# Patient Record
Sex: MALE | Race: WHITE | NOT HISPANIC OR LATINO
[De-identification: names, ages, dates, MRNs, and addresses within clinical notes are randomized per-mention and may not be internally consistent; named-entity substitution may affect disease eponyms.]

---

## 2023-06-20 ENCOUNTER — APPOINTMENT (OUTPATIENT)
Dept: UROLOGY | Facility: CLINIC | Age: 73
End: 2023-06-20
Payer: MEDICARE

## 2023-06-20 VITALS
RESPIRATION RATE: 17 BRPM | DIASTOLIC BLOOD PRESSURE: 77 MMHG | HEIGHT: 66 IN | TEMPERATURE: 97.9 F | BODY MASS INDEX: 25.71 KG/M2 | SYSTOLIC BLOOD PRESSURE: 126 MMHG | HEART RATE: 92 BPM | WEIGHT: 160 LBS

## 2023-06-20 DIAGNOSIS — Z80.3 FAMILY HISTORY OF MALIGNANT NEOPLASM OF BREAST: ICD-10-CM

## 2023-06-20 DIAGNOSIS — Z87.891 PERSONAL HISTORY OF NICOTINE DEPENDENCE: ICD-10-CM

## 2023-06-20 DIAGNOSIS — Z78.9 OTHER SPECIFIED HEALTH STATUS: ICD-10-CM

## 2023-06-20 PROCEDURE — 51798 US URINE CAPACITY MEASURE: CPT

## 2023-06-20 PROCEDURE — 99203 OFFICE O/P NEW LOW 30 MIN: CPT

## 2023-06-20 NOTE — ASSESSMENT
[FreeTextEntry1] : History of severe phimosis x several years The last year or 2 it has gotten worse. The urine balloons in the foreskin .\par We discussed the risks and complication of the surgery including bleeding , infection . We discussed pain management

## 2023-06-20 NOTE — PHYSICAL EXAM
[General Appearance - Well Developed] : well developed [General Appearance - Well Nourished] : well nourished [] : no respiratory distress [Normal Station and Gait] : the gait and station were normal for the patient's age [Skin Color & Pigmentation] : normal skin color and pigmentation [No Focal Deficits] : no focal deficits [Oriented To Time, Place, And Person] : oriented to person, place, and time [No Palpable Adenopathy] : no palpable adenopathy [FreeTextEntry1] : severe phimosis

## 2023-07-13 ENCOUNTER — OUTPATIENT (OUTPATIENT)
Dept: OUTPATIENT SERVICES | Facility: HOSPITAL | Age: 73
LOS: 1 days | End: 2023-07-13
Payer: MEDICARE

## 2023-07-13 VITALS
TEMPERATURE: 98 F | SYSTOLIC BLOOD PRESSURE: 128 MMHG | HEART RATE: 70 BPM | HEIGHT: 66.5 IN | WEIGHT: 156.09 LBS | DIASTOLIC BLOOD PRESSURE: 80 MMHG | RESPIRATION RATE: 16 BRPM | OXYGEN SATURATION: 97 %

## 2023-07-13 DIAGNOSIS — N47.1 PHIMOSIS: ICD-10-CM

## 2023-07-13 DIAGNOSIS — Z98.890 OTHER SPECIFIED POSTPROCEDURAL STATES: Chronic | ICD-10-CM

## 2023-07-13 LAB
ANION GAP SERPL CALC-SCNC: 10 MMOL/L — SIGNIFICANT CHANGE UP (ref 7–14)
BUN SERPL-MCNC: 17 MG/DL — SIGNIFICANT CHANGE UP (ref 7–23)
CALCIUM SERPL-MCNC: 9.9 MG/DL — SIGNIFICANT CHANGE UP (ref 8.4–10.5)
CHLORIDE SERPL-SCNC: 105 MMOL/L — SIGNIFICANT CHANGE UP (ref 98–107)
CO2 SERPL-SCNC: 26 MMOL/L — SIGNIFICANT CHANGE UP (ref 22–31)
CREAT SERPL-MCNC: 1.05 MG/DL — SIGNIFICANT CHANGE UP (ref 0.5–1.3)
EGFR: 75 ML/MIN/1.73M2 — SIGNIFICANT CHANGE UP
GLUCOSE SERPL-MCNC: 71 MG/DL — SIGNIFICANT CHANGE UP (ref 70–99)
HCT VFR BLD CALC: 46.9 % — SIGNIFICANT CHANGE UP (ref 39–50)
HGB BLD-MCNC: 15.8 G/DL — SIGNIFICANT CHANGE UP (ref 13–17)
MCHC RBC-ENTMCNC: 31.3 PG — SIGNIFICANT CHANGE UP (ref 27–34)
MCHC RBC-ENTMCNC: 33.7 GM/DL — SIGNIFICANT CHANGE UP (ref 32–36)
MCV RBC AUTO: 93.1 FL — SIGNIFICANT CHANGE UP (ref 80–100)
NRBC # BLD: 0 /100 WBCS — SIGNIFICANT CHANGE UP (ref 0–0)
NRBC # FLD: 0 K/UL — SIGNIFICANT CHANGE UP (ref 0–0)
PLATELET # BLD AUTO: 164 K/UL — SIGNIFICANT CHANGE UP (ref 150–400)
POTASSIUM SERPL-MCNC: 4.8 MMOL/L — SIGNIFICANT CHANGE UP (ref 3.5–5.3)
POTASSIUM SERPL-SCNC: 4.8 MMOL/L — SIGNIFICANT CHANGE UP (ref 3.5–5.3)
RBC # BLD: 5.04 M/UL — SIGNIFICANT CHANGE UP (ref 4.2–5.8)
RBC # FLD: 13.4 % — SIGNIFICANT CHANGE UP (ref 10.3–14.5)
SODIUM SERPL-SCNC: 141 MMOL/L — SIGNIFICANT CHANGE UP (ref 135–145)
WBC # BLD: 8.13 K/UL — SIGNIFICANT CHANGE UP (ref 3.8–10.5)
WBC # FLD AUTO: 8.13 K/UL — SIGNIFICANT CHANGE UP (ref 3.8–10.5)

## 2023-07-13 PROCEDURE — 93010 ELECTROCARDIOGRAM REPORT: CPT

## 2023-07-13 RX ORDER — SODIUM CHLORIDE 9 MG/ML
1000 INJECTION, SOLUTION INTRAVENOUS
Refills: 0 | Status: DISCONTINUED | OUTPATIENT
Start: 2023-07-19 | End: 2023-08-01

## 2023-07-13 NOTE — H&P PST ADULT - NSICDXFAMILYHX_GEN_ALL_CORE_FT
FAMILY HISTORY:  Sibling  Still living? Yes, Estimated age: Age Unknown  FH: thyroid cancer, Age at diagnosis: Age Unknown    Child  Still living? Unknown  FH: leukemia, Age at diagnosis: Age Unknown

## 2023-07-13 NOTE — H&P PST ADULT - ENMT COMMENTS
denies loose teeth, has crowns, has permanent upper and lower bridge, has partial upper denture Mallampati I/II on phonation

## 2023-07-13 NOTE — H&P PST ADULT - NSANTHOSAYNRD_GEN_A_CORE
No. ROSEMARIE screening performed.  STOP BANG Legend: 0-2 = LOW Risk; 3-4 = INTERMEDIATE Risk; 5-8 = HIGH Risk

## 2023-07-13 NOTE — H&P PST ADULT - NSICDXPASTMEDICALHX_GEN_ALL_CORE_FT
PAST MEDICAL HISTORY:  H/O medial meniscus repair of left knee     H/O microdiscectomy     H/O repair of left rotator cuff     H/O right inguinal hernia repair     History of pneumonia     History of removal of cyst      PAST MEDICAL HISTORY:  History of pneumonia     Phimosis

## 2023-07-13 NOTE — H&P PST ADULT - NSICDXPASTSURGICALHX_GEN_ALL_CORE_FT
PAST SURGICAL HISTORY:  H/O medial meniscus repair of left knee     H/O microdiscectomy     H/O repair of left rotator cuff     H/O right inguinal hernia repair     History of removal of cyst

## 2023-07-15 NOTE — ASU PATIENT PROFILE, ADULT - FALL HARM RISK - UNIVERSAL INTERVENTIONS
Bed in lowest position, wheels locked, appropriate side rails in place/Call bell, personal items and telephone in reach/Instruct patient to call for assistance before getting out of bed or chair/Non-slip footwear when patient is out of bed/[place] to call system/Physically safe environment - no spills, clutter or unnecessary equipment/Purposeful Proactive Rounding/Room/bathroom lighting operational, light cord in reach

## 2023-07-16 ENCOUNTER — TRANSCRIPTION ENCOUNTER (OUTPATIENT)
Age: 73
End: 2023-07-16

## 2023-07-17 ENCOUNTER — TRANSCRIPTION ENCOUNTER (OUTPATIENT)
Age: 73
End: 2023-07-17

## 2023-07-17 ENCOUNTER — APPOINTMENT (OUTPATIENT)
Dept: UROLOGY | Facility: HOSPITAL | Age: 73
End: 2023-07-17

## 2023-07-17 ENCOUNTER — OUTPATIENT (OUTPATIENT)
Dept: OUTPATIENT SERVICES | Facility: HOSPITAL | Age: 73
LOS: 1 days | Discharge: HOME | End: 2023-07-17
Payer: MEDICARE

## 2023-07-17 ENCOUNTER — RESULT REVIEW (OUTPATIENT)
Age: 73
End: 2023-07-17

## 2023-07-17 VITALS
SYSTOLIC BLOOD PRESSURE: 103 MMHG | RESPIRATION RATE: 16 BRPM | OXYGEN SATURATION: 100 % | HEART RATE: 60 BPM | DIASTOLIC BLOOD PRESSURE: 69 MMHG

## 2023-07-17 VITALS
HEART RATE: 65 BPM | RESPIRATION RATE: 18 BRPM | WEIGHT: 156.09 LBS | DIASTOLIC BLOOD PRESSURE: 87 MMHG | TEMPERATURE: 98 F | HEIGHT: 66.5 IN | OXYGEN SATURATION: 100 % | SYSTOLIC BLOOD PRESSURE: 116 MMHG

## 2023-07-17 DIAGNOSIS — N47.1 PHIMOSIS: ICD-10-CM

## 2023-07-17 DIAGNOSIS — Z98.890 OTHER SPECIFIED POSTPROCEDURAL STATES: Chronic | ICD-10-CM

## 2023-07-17 PROCEDURE — 54161 CIRCUM 28 DAYS OR OLDER: CPT

## 2023-07-17 PROCEDURE — 88304 TISSUE EXAM BY PATHOLOGIST: CPT | Mod: 26

## 2023-07-17 RX ORDER — OXYCODONE HYDROCHLORIDE 5 MG/1
5 TABLET ORAL ONCE
Refills: 0 | Status: DISCONTINUED | OUTPATIENT
Start: 2023-07-17 | End: 2023-07-17

## 2023-07-17 RX ORDER — FENTANYL CITRATE 50 UG/ML
50 INJECTION INTRAVENOUS
Refills: 0 | Status: DISCONTINUED | OUTPATIENT
Start: 2023-07-17 | End: 2023-07-17

## 2023-07-17 RX ORDER — ACETAMINOPHEN 500 MG
1000 TABLET ORAL ONCE
Refills: 0 | Status: COMPLETED | OUTPATIENT
Start: 2023-07-17 | End: 2023-07-17

## 2023-07-17 RX ORDER — ONDANSETRON 8 MG/1
4 TABLET, FILM COATED ORAL ONCE
Refills: 0 | Status: DISCONTINUED | OUTPATIENT
Start: 2023-07-17 | End: 2023-08-01

## 2023-07-17 RX ADMIN — OXYCODONE HYDROCHLORIDE 5 MILLIGRAM(S): 5 TABLET ORAL at 17:34

## 2023-07-17 RX ADMIN — Medication 1000 MILLIGRAM(S): at 17:45

## 2023-07-17 RX ADMIN — OXYCODONE HYDROCHLORIDE 5 MILLIGRAM(S): 5 TABLET ORAL at 18:00

## 2023-07-17 RX ADMIN — Medication 400 MILLIGRAM(S): at 17:30

## 2023-07-17 NOTE — ASU DISCHARGE PLAN (ADULT/PEDIATRIC) - NURSING INSTRUCTIONS
Please report any signs and symptoms of infection including Fever (Temp >101 or >100.4 if GYN procedure), uncontrollable nausea, vomiting, diarrhea, chills & inability to urinate. Shower with soap & water when appropriate, pat dry with clean towel & no ointments, creams, powders or lotions on incisions unless okayed by MD. Please report any puss or increased drainage from incision sites, or if redness develops and spreads around sites. Please practice good hand hygiene especially after using the bathroom. Follow up with all MD appointments and take medication(s) as prescribed  DO NOT take any Tylenol (Acetaminophen) or narcotics containing Tylenol until after 11:30 pm on 7/17/23 . You received Tylenol during your operation and it can cause damage to your liver if too much is taken within a 24 hour time period.

## 2023-07-17 NOTE — ASU DISCHARGE PLAN (ADULT/PEDIATRIC) - FOLLOW UP APPOINTMENTS
500 may also call Recovery Room (PACU) 24/7 @ [phone]/[facility]

## 2023-07-17 NOTE — ASU DISCHARGE PLAN (ADULT/PEDIATRIC) - ASU DC SPECIAL INSTRUCTIONSFT
PAIN CONTROL: You may take 650 mg of Tylenol every 4-6 hours. Do not exceed 4 grams of Tylenol daily. Take oxycodone as needed for severe pain, one tab every 6 hours. Do not exceed 4 tabs daily.  BATHING: Please do not submerge wound underwater. You may shower after 48 hours and/or sponge bathe.  ACTIVITY: No heavy lifting or straining. Otherwise, you may return to your usual level of physical activity. If you are taking narcotic pain medication (such as oxycodone), do NOT drive a car, operate machinery or make important decisions.  DIET: Return to your usual diet.  NOTIFY YOUR SURGEON IF: You have any bleeding that does not stop, any pus draining from your wound, any fever (over 100.4 F) or chills, persistent nausea/vomiting, persistent diarrhea, or if your pain is not controlled on your discharge pain medications.  FOLLOW-UP:  1. Please call your surgeon to make a follow-up appointment within 1-2 weeks of discharge

## 2023-07-19 ENCOUNTER — APPOINTMENT (OUTPATIENT)
Dept: UROLOGY | Facility: HOSPITAL | Age: 73
End: 2023-07-19

## 2023-07-31 LAB — SURGICAL PATHOLOGY STUDY: SIGNIFICANT CHANGE UP

## 2023-08-04 NOTE — BRIEF OPERATIVE NOTE - ASSISTANT(S)
Jigna Marinelli is a 24 y.o. male who presents to the office today for the following:    Chief Complaint   Patient presents with    New Patient     Estab care       Past Medical History:   Diagnosis Date    Allergic rhinitis         History reviewed. No pertinent surgical history. Family History   Problem Relation Age of Onset    Diabetes Other         Social History     Tobacco Use    Smoking status: Never    Smokeless tobacco: Never   Vaping Use    Vaping Use: Never used   Substance Use Topics    Alcohol use: Yes     Comment: occassional    Drug use: Never        HPI  Patient here to establish as new patient with provider and annual wellness exam with [facility] vitiligo and seasonal allergies. Follows with Dermatology in [place]. Previous PCP Francie in [place], records requested. 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 Denies complaints today. Chief Complaint   Patient presents with    New Patient     Estab care       No current outpatient medications on file prior to visit. No current facility-administered medications on file prior to visit. Current Outpatient Medications   Medication Sig Dispense Refill    fluticasone (FLONASE) 50 MCG/ACT nasal spray 1 spray by Each Nostril route daily 16 g 3    levocetirizine (XYZAL) 5 MG tablet Take 1 tablet by mouth nightly 30 tablet 3    montelukast (SINGULAIR) 10 MG tablet Take 1 tablet by mouth daily 30 tablet 3     No current facility-administered medications for this visit. Review of Systems   Constitutional:  Negative for activity change, chills, fatigue and fever. HENT:  Negative for congestion, ear pain, postnasal drip, rhinorrhea, sinus pain, sneezing and sore throat. Eyes:  Negative for pain and visual disturbance. Cardiovascular:  Negative for chest pain, palpitations and leg swelling. Gastrointestinal:  Negative for abdominal pain, blood in stool, constipation, diarrhea, nausea and vomiting. Betzaida Sylvester

## 2023-08-16 ENCOUNTER — APPOINTMENT (OUTPATIENT)
Dept: UROLOGY | Facility: CLINIC | Age: 73
End: 2023-08-16
Payer: MEDICARE

## 2023-08-16 VITALS
TEMPERATURE: 98.3 F | DIASTOLIC BLOOD PRESSURE: 82 MMHG | SYSTOLIC BLOOD PRESSURE: 113 MMHG | HEIGHT: 66 IN | BODY MASS INDEX: 30.53 KG/M2 | HEART RATE: 111 BPM | RESPIRATION RATE: 17 BRPM | WEIGHT: 190 LBS

## 2023-08-16 DIAGNOSIS — Z98.890 OTHER SPECIFIED POSTPROCEDURAL STATES: ICD-10-CM

## 2023-08-16 PROCEDURE — 99024 POSTOP FOLLOW-UP VISIT: CPT

## 2023-08-16 RX ORDER — CLOTRIMAZOLE AND BETAMETHASONE DIPROPIONATE 10; .5 MG/G; MG/G
1-0.05 CREAM TOPICAL
Qty: 1 | Refills: 1 | Status: ACTIVE | COMMUNITY
Start: 2023-08-16 | End: 1900-01-01

## 2023-08-16 NOTE — ASSESSMENT
[FreeTextEntry1] : He is doing well . There was some sensativity initially post op but it has decreased over time . There is scaly dry skin on the head of the penois will give him some cream to apply 2 x/day  Follow up prn

## 2023-08-16 NOTE — PHYSICAL EXAM
[Abdomen Soft] : soft [Heart Rate And Rhythm] : Heart rate and rhythm were normal [] : no respiratory distress [Oriented To Time, Place, And Person] : oriented to person, place, and time [Normal Station and Gait] : the gait and station were normal for the patient's age

## 2023-08-17 PROBLEM — Z98.890 HISTORY OF CIRCUMCISION: Status: RESOLVED | Noted: 2023-08-16 | Resolved: 2023-08-17

## 2023-10-27 ENCOUNTER — APPOINTMENT (OUTPATIENT)
Dept: UROLOGY | Facility: CLINIC | Age: 73
End: 2023-10-27
Payer: MEDICARE

## 2023-10-27 PROCEDURE — 99213 OFFICE O/P EST LOW 20 MIN: CPT

## 2023-11-03 ENCOUNTER — APPOINTMENT (OUTPATIENT)
Dept: UROLOGY | Facility: CLINIC | Age: 73
End: 2023-11-03
Payer: MEDICARE

## 2023-11-03 DIAGNOSIS — N47.1 PHIMOSIS: ICD-10-CM

## 2023-11-03 PROCEDURE — 54162 LYSIS PENIL CIRCUMIC LESION: CPT

## 2023-12-01 ENCOUNTER — APPOINTMENT (OUTPATIENT)
Dept: UROLOGY | Facility: CLINIC | Age: 73
End: 2023-12-01

## 2024-03-16 ENCOUNTER — APPOINTMENT (OUTPATIENT)
Dept: ORTHOPEDIC SURGERY | Facility: CLINIC | Age: 74
End: 2024-03-16
Payer: MEDICARE

## 2024-03-16 VITALS
SYSTOLIC BLOOD PRESSURE: 130 MMHG | HEIGHT: 66 IN | DIASTOLIC BLOOD PRESSURE: 77 MMHG | WEIGHT: 155 LBS | HEART RATE: 80 BPM | BODY MASS INDEX: 24.91 KG/M2

## 2024-03-16 DIAGNOSIS — M17.0 BILATERAL PRIMARY OSTEOARTHRITIS OF KNEE: ICD-10-CM

## 2024-03-16 DIAGNOSIS — M25.569 PAIN IN UNSPECIFIED KNEE: ICD-10-CM

## 2024-03-16 DIAGNOSIS — M25.552 PAIN IN LEFT HIP: ICD-10-CM

## 2024-03-16 DIAGNOSIS — G89.29 PAIN IN UNSPECIFIED HIP: ICD-10-CM

## 2024-03-16 DIAGNOSIS — M16.12 UNILATERAL PRIMARY OSTEOARTHRITIS, LEFT HIP: ICD-10-CM

## 2024-03-16 DIAGNOSIS — M25.559 PAIN IN UNSPECIFIED HIP: ICD-10-CM

## 2024-03-16 PROCEDURE — 73502 X-RAY EXAM HIP UNI 2-3 VIEWS: CPT | Mod: LT

## 2024-03-16 PROCEDURE — 99204 OFFICE O/P NEW MOD 45 MIN: CPT | Mod: 25

## 2024-03-16 PROCEDURE — 20610 DRAIN/INJ JOINT/BURSA W/O US: CPT | Mod: RT

## 2024-03-16 PROCEDURE — 73562 X-RAY EXAM OF KNEE 3: CPT | Mod: 50

## 2024-03-16 RX ORDER — LIDOCAINE HYDROCHLORIDE 10 MG/ML
1 INJECTION, SOLUTION INFILTRATION; PERINEURAL
Refills: 0 | Status: COMPLETED | OUTPATIENT
Start: 2024-03-16

## 2024-03-16 RX ORDER — METHYLPRED ACET/NACL,ISO-OS/PF 40 MG/ML
40 VIAL (ML) INJECTION
Qty: 1 | Refills: 0 | Status: COMPLETED | OUTPATIENT
Start: 2024-03-16

## 2024-03-16 RX ADMIN — METHYLPREDNISOLONE ACETATE 2 MG/ML: 40 INJECTION, SUSPENSION INTRA-ARTICULAR; INTRALESIONAL; INTRAMUSCULAR; SOFT TISSUE at 00:00

## 2024-03-16 RX ADMIN — LIDOCAINE HYDROCHLORIDE 3 %: 10 INJECTION, SOLUTION INFILTRATION; PERINEURAL at 00:00

## 2024-03-16 NOTE — PHYSICAL EXAM
[Antalgic] : antalgic [LE] : Sensory: Intact in bilateral lower extremities [DP] : dorsalis pedis 2+ and symmetric bilaterally [PT] : posterior tibial 2+ and symmetric bilaterally [Normal] : no peripheral adenopathy appreciated [de-identified] : X-rays of the right and left knee: There is arthritic changes most notably in the medial compartment of both knees there is chondrocalcinosis of the right knee noted  X-rays of the left hip show severe arthritic changes of the left hip with bone-on-bone contact periarticular osteophytes and cystic changes. [de-identified] : Examination of both knees: Inspection: There is neutral alignment noted there is no signs of trauma there is normal muscle tone Palpation: There is significant tenderness over the medial joint line of the right knee. Effusion: None bilateral Range of motion: 0-1 40 the right knee experiences significant pain with deep flexion Ligamentous exam: Stable with varus and valgus stress and anterior posterior drawer. Motor and sensory exam in the bilateral lower extremities is intact.  Examination left hip: Inspection: Leg lengths are grossly equal measured supine on the exam table Palpation: No tenderness to palpation Range of motion: Significant pain and decreased range of motion hip flexion 0-70 external rotation 0-20 internal rotation 0 to 10 degrees

## 2024-03-16 NOTE — REASON FOR VISIT
[Initial Visit] : an initial visit for [Hip Pain] : hip pain [Knee Pain] : knee pain [FreeTextEntry2] : bilateral knee pain

## 2024-03-16 NOTE — DISCUSSION/SUMMARY
[Surgical risks reviewed] : Surgical risks reviewed [Medication Risks Reviewed] : Medication risks reviewed [de-identified] : Impression: Bilateral knee osteoarthritis Bilateral knee pain Left hip osteoarthritis  Plan: Treatment options were reviewed with the patient he does have end-stage arthritis of his left hip and failure of conservative treatment risks benefits alternatives to an anterior total hip arthroplasty were reviewed with the patient the preoperative operative and postoperative course were also reviewed and all his questions were answered.  He does have arthritic changes noted on x-ray of his knees but his right knee is having mechanical symptoms at this time would recommend obtaining an MRI of the right knee to assess for internal derangement as well as to assess the articular surfaces for the extent of the arthritis in the meantime did offer him a steroid injection in the right knee to temporize his symptoms.  He would like to proceed with the steroid injection risks benefits alternatives to the steroid injection of the right knee were reviewed with the patient postinjection instructions were given.  Patient would like to proceed with a left anterior total hip arthroplasty.  I have  seen and evaluated this patient and is guiding this patients entire orthopedic management plan. The patient was advised that based upon their clinical presentation and radiographic findings they meet inclusion criteria for benefitting from an Anterior approach(ASI) total hip arthroplasty as a treatment option for severe arthritis of their right hip. The spectrum of treatments for severe hip DJD were reviewed in detail. I reviewed in detail the goals, alternatives, risks and benefits of ASI total hip arthroplasty.  The patient was advised of the possible complications which are inclusive of but not exclusive to VTE-related, infectious-related, anesthetic-related, surgically-related, Lateral Femoral Cutaneous nerve injury-related, medically-related, and implant-related. The patient was advised that limb length equality may not be assured secondary to variabilities in pelvic malrotation, stable intraoperative fit, opposite side wear, and other anatomic deformities of the lower extremity. The patient was advised of the goals, alternatives, risks and benefits of autologous, directed and banked blood product transfusions for perioperative blood losses. A non-cemented type hip implant is utilized in consideration of bony integrity intraoperatively.  The patient was educated regarding the surgical procedure steps, especially using an Hip implant and bone model, as well as steps in their hospital course and primary discharge planning for home discharge with home care and home PT. Choices for implant 's, mainly DJO and Biomet-Carmen were reviewed, with selection to be made by surgeon intraoperatively. The patient was advised of the goals, alternatives, risks and benefits of a 3 week course of Celebrex 200 mg BID utilized by Dr. Webb in their practice to prevent Heterotopic Ossification seen in patients post total hip arthroplasty. If this is medically contraindicated the alternative would be a single dose (800cGy) radiation treatment to the hip implant site performed pre-operatively. A consultation with the Radiation Oncologist at [facility] will then be required. I reviewed the plan of care as well as a model of the Hip implant equivalent to the one that will be used for the THR. The patient agreed to the plan of care as well as the use of implants for their THR. The patient was advised that they will require a medical preoperative risk evaluation by their PCP. Further medical subspecialty clearances such as cardiac may be indicated if felt needed by their PCP. The patient was advised he/she may call our office after careful consideration of their treatment options and further consultation The patient was thoroughly educated using models and the actual implant.  All of the patient's questions were answered to their satisfaction.  The patient would need a bedside commode and a rolling walker for use for activities of daily living status post a total joint replacement .

## 2024-03-16 NOTE — HISTORY OF PRESENT ILLNESS
[de-identified] : 73-year-old male presents complaining of bilateral knee pain has had for several years.  He notes a dull achy pain in both knees worse with activity ascending descending stairs bending and squatting to maneuvers long periods of ambulation the right knee is worse than the left he feels like the knees can give out on him and it feels popping and clicking.  He denies any pain at rest he also notes left groin pain which is also worse with activity he has difficulty putting shoes and socks on the left side he currently ambulates without assistive devices he is otherwise in his normal state of health he has tried physical therapy anti-inflammatory medication.  He has had injection in his knee in the past but it was a long time ago.

## 2024-03-16 NOTE — PROCEDURE
[Injection] : Injection [Right] : of the right [Knee Joint] : knee joint [Joint Pain] : Joint pain [Inflammation] : Inflammation [Osteoarthritis] : Osteoarthritis [Risk] : risk [Patient] : patient [Benefits] : benefits [Alternatives] : alternatives [Bleeding] : bleeding [Infection] : infection [Verbal Consent Obtained] : verbal consent was obtained prior to the procedure [Allergic Reaction] : allergic reaction [Medial] : medial [22] : a 22-gauge [Methylpred. 40mg/mL___(mL)] : [unfilled] mL of 40mg/mL methylprednisolone [1.5  inch] : 1.5 inch needle [Tolerated Well] : The patient tolerated the procedure well [Bandage Applied] : a bandage [None] : none [No Strenuous Activity___day(s)] : avoid strenuous activity for [unfilled] day(s) [PRN] : as needed [FreeTextEntry1] : chlorahexadine

## 2024-03-20 ENCOUNTER — APPOINTMENT (OUTPATIENT)
Dept: MRI IMAGING | Facility: CLINIC | Age: 74
End: 2024-03-20
Payer: MEDICARE

## 2024-03-20 ENCOUNTER — OUTPATIENT (OUTPATIENT)
Dept: OUTPATIENT SERVICES | Facility: HOSPITAL | Age: 74
LOS: 1 days | End: 2024-03-20
Payer: MEDICARE

## 2024-03-20 ENCOUNTER — RESULT REVIEW (OUTPATIENT)
Age: 74
End: 2024-03-20

## 2024-03-20 DIAGNOSIS — Z98.890 OTHER SPECIFIED POSTPROCEDURAL STATES: Chronic | ICD-10-CM

## 2024-03-20 DIAGNOSIS — M25.569 PAIN IN UNSPECIFIED KNEE: ICD-10-CM

## 2024-03-20 PROCEDURE — A9585: CPT

## 2024-03-20 PROCEDURE — 73723 MRI JOINT LWR EXTR W/O&W/DYE: CPT

## 2024-03-20 PROCEDURE — 73723 MRI JOINT LWR EXTR W/O&W/DYE: CPT | Mod: 26,RT

## 2024-03-26 ENCOUNTER — OUTPATIENT (OUTPATIENT)
Dept: OUTPATIENT SERVICES | Facility: HOSPITAL | Age: 74
LOS: 1 days | End: 2024-03-26
Payer: MEDICARE

## 2024-03-26 VITALS
RESPIRATION RATE: 14 BRPM | DIASTOLIC BLOOD PRESSURE: 80 MMHG | SYSTOLIC BLOOD PRESSURE: 130 MMHG | HEART RATE: 70 BPM | HEIGHT: 66.5 IN | TEMPERATURE: 97 F | WEIGHT: 145.95 LBS | OXYGEN SATURATION: 99 %

## 2024-03-26 DIAGNOSIS — Z01.818 ENCOUNTER FOR OTHER PREPROCEDURAL EXAMINATION: ICD-10-CM

## 2024-03-26 DIAGNOSIS — Z98.890 OTHER SPECIFIED POSTPROCEDURAL STATES: Chronic | ICD-10-CM

## 2024-03-26 DIAGNOSIS — M16.12 UNILATERAL PRIMARY OSTEOARTHRITIS, LEFT HIP: ICD-10-CM

## 2024-03-26 LAB
A1C WITH ESTIMATED AVERAGE GLUCOSE RESULT: 5.5 % — SIGNIFICANT CHANGE UP (ref 4–5.6)
ALBUMIN SERPL ELPH-MCNC: 3.7 G/DL — SIGNIFICANT CHANGE UP (ref 3.3–5)
ALP SERPL-CCNC: 117 U/L — SIGNIFICANT CHANGE UP (ref 30–120)
ALT FLD-CCNC: 26 U/L — SIGNIFICANT CHANGE UP (ref 10–60)
ANION GAP SERPL CALC-SCNC: 6 MMOL/L — SIGNIFICANT CHANGE UP (ref 5–17)
APTT BLD: 31.5 SEC — SIGNIFICANT CHANGE UP (ref 24.5–35.6)
AST SERPL-CCNC: 17 U/L — SIGNIFICANT CHANGE UP (ref 10–40)
BILIRUB SERPL-MCNC: 0.3 MG/DL — SIGNIFICANT CHANGE UP (ref 0.2–1.2)
BLD GP AB SCN SERPL QL: SIGNIFICANT CHANGE UP
BUN SERPL-MCNC: 19 MG/DL — SIGNIFICANT CHANGE UP (ref 7–23)
CALCIUM SERPL-MCNC: 9.6 MG/DL — SIGNIFICANT CHANGE UP (ref 8.4–10.5)
CHLORIDE SERPL-SCNC: 104 MMOL/L — SIGNIFICANT CHANGE UP (ref 96–108)
CO2 SERPL-SCNC: 31 MMOL/L — SIGNIFICANT CHANGE UP (ref 22–31)
CREAT SERPL-MCNC: 1.06 MG/DL — SIGNIFICANT CHANGE UP (ref 0.5–1.3)
EGFR: 74 ML/MIN/1.73M2 — SIGNIFICANT CHANGE UP
ESTIMATED AVERAGE GLUCOSE: 111 MG/DL — SIGNIFICANT CHANGE UP (ref 68–114)
GLUCOSE SERPL-MCNC: 86 MG/DL — SIGNIFICANT CHANGE UP (ref 70–99)
HCT VFR BLD CALC: 45.7 % — SIGNIFICANT CHANGE UP (ref 39–50)
HGB BLD-MCNC: 15.6 G/DL — SIGNIFICANT CHANGE UP (ref 13–17)
INR BLD: 1 RATIO — SIGNIFICANT CHANGE UP (ref 0.85–1.18)
MCHC RBC-ENTMCNC: 31.6 PG — SIGNIFICANT CHANGE UP (ref 27–34)
MCHC RBC-ENTMCNC: 34.1 GM/DL — SIGNIFICANT CHANGE UP (ref 32–36)
MCV RBC AUTO: 92.7 FL — SIGNIFICANT CHANGE UP (ref 80–100)
MRSA PCR RESULT.: SIGNIFICANT CHANGE UP
NRBC # BLD: 0 /100 WBCS — SIGNIFICANT CHANGE UP (ref 0–0)
PLATELET # BLD AUTO: 176 K/UL — SIGNIFICANT CHANGE UP (ref 150–400)
POTASSIUM SERPL-MCNC: 5.4 MMOL/L — HIGH (ref 3.5–5.3)
POTASSIUM SERPL-SCNC: 5.4 MMOL/L — HIGH (ref 3.5–5.3)
PROT SERPL-MCNC: 7.4 G/DL — SIGNIFICANT CHANGE UP (ref 6–8.3)
PROTHROM AB SERPL-ACNC: 11.2 SEC — SIGNIFICANT CHANGE UP (ref 9.5–13)
RBC # BLD: 4.93 M/UL — SIGNIFICANT CHANGE UP (ref 4.2–5.8)
RBC # FLD: 12.6 % — SIGNIFICANT CHANGE UP (ref 10.3–14.5)
S AUREUS DNA NOSE QL NAA+PROBE: DETECTED
SODIUM SERPL-SCNC: 141 MMOL/L — SIGNIFICANT CHANGE UP (ref 135–145)
WBC # BLD: 8.81 K/UL — SIGNIFICANT CHANGE UP (ref 3.8–10.5)
WBC # FLD AUTO: 8.81 K/UL — SIGNIFICANT CHANGE UP (ref 3.8–10.5)

## 2024-03-26 PROCEDURE — G0463: CPT

## 2024-03-26 PROCEDURE — 93010 ELECTROCARDIOGRAM REPORT: CPT

## 2024-03-26 PROCEDURE — 93005 ELECTROCARDIOGRAM TRACING: CPT

## 2024-03-26 NOTE — H&P PST ADULT - NEUROLOGICAL
normal/sensation intact/responds to pain/responds to verbal commands negative sensation intact/responds to pain/responds to verbal commands

## 2024-03-26 NOTE — H&P PST ADULT - PROBLEM SELECTOR PLAN 1
scheduled for left hip replacement on 4/10/24  will obtain medical clearance   pre op instructions scheduled for left hip replacement on 4/10/24  will obtain medical clearance   pre op instructions on wash and medications

## 2024-03-26 NOTE — H&P PST ADULT - HISTORY OF PRESENT ILLNESS
75 y/o male presents with lommgstanding history of worsening left hip pain , Reports dull achy pain when walking , standing and climbing up and down  73 y/o male presents with long standing  history of worsening left hip pain , Reports dull achy pain when walking , standing and climbing up and down stairs .scheduled for left hip replacement on 4/10/24

## 2024-03-27 RX ORDER — MUPIROCIN 20 MG/G
1 OINTMENT TOPICAL
Qty: 1 | Refills: 0
Start: 2024-03-27 | End: 2024-03-31

## 2024-03-28 PROBLEM — M16.12 UNILATERAL PRIMARY OSTEOARTHRITIS, LEFT HIP: Chronic | Status: ACTIVE | Noted: 2024-03-26

## 2024-03-28 PROBLEM — H91.90 UNSPECIFIED HEARING LOSS, UNSPECIFIED EAR: Chronic | Status: ACTIVE | Noted: 2024-03-26

## 2024-04-05 RX ORDER — CHLORHEXIDINE GLUCONATE 213 G/1000ML
1 SOLUTION TOPICAL ONCE
Refills: 0 | Status: DISCONTINUED | OUTPATIENT
Start: 2024-04-10 | End: 2024-04-11

## 2024-04-05 RX ORDER — APREPITANT 80 MG/1
40 CAPSULE ORAL ONCE
Refills: 0 | Status: DISCONTINUED | OUTPATIENT
Start: 2024-04-10 | End: 2024-04-11

## 2024-04-09 ENCOUNTER — APPOINTMENT (OUTPATIENT)
Dept: ORTHOPEDIC SURGERY | Facility: CLINIC | Age: 74
End: 2024-04-09
Payer: MEDICARE

## 2024-04-09 VITALS
WEIGHT: 155 LBS | HEIGHT: 66 IN | SYSTOLIC BLOOD PRESSURE: 115 MMHG | BODY MASS INDEX: 24.91 KG/M2 | HEART RATE: 80 BPM | DIASTOLIC BLOOD PRESSURE: 77 MMHG

## 2024-04-09 PROCEDURE — 99214 OFFICE O/P EST MOD 30 MIN: CPT

## 2024-04-09 NOTE — HISTORY OF PRESENT ILLNESS
[de-identified] : 74-year-old male presents for MRI review of the right knee.  He states his pain is continuing which she rates as a 5-6 out of 10.  His symptoms are remaining stable.  He is continuing to take anti-inflammatories with some improvement in symptoms.  He states the cortisone injection he received last office visit did help to alleviate some of the pain however he is still feeling mild discomfort. Denies fevers, chills, chest pain, calf pain, shortness of breath. [Improving] : improving [5] : an average pain level of 5/10 [6] : a maximum pain level of 6/10 [Standing] : standing [Intermit.] : ~He/She~ states the symptoms seem to be intermittent [Walking] : worsened by walking [Knee Flexion] : worsened with knee flexion [Knee Extension] : worsened with knee extension [NSAIDs] : relieved by nonsteroidal anti-inflammatory drugs [Rest] : relieved by rest

## 2024-04-09 NOTE — END OF VISIT
[FreeTextEntry3] : I Dr. Webb, personally performed the evaluation and management services for this established patient who present today with a new problem/exacerbation of an existing condition. The E/M includes conducting the examination, assessing all new/exacerbated conditions, and establishing a new plan of care. Today, My ACP was here to assist in my evaluation and management services of this new problem/exacerbated condition to be followed going forward.

## 2024-04-09 NOTE — DISCUSSION/SUMMARY
[de-identified] : Treatment options were reviewed in detail with the patient including conservative versus surgical management.  At this time he is scheduled to have a total hip replacement tomorrow however advised he would benefit from an arthroscopic surgery once he has fully recovered from the total hip replacement.  Reviewed risk benefits and alternatives to surgery in detail including infection risk of blood clot complications from anesthesia including possible death. Patient expressed understanding would like to proceed.  All patient's question concerns were addressed satisfaction.  If any new or worsening symptoms arise advised patient call the office.  He will call to schedule his knee arthroscopy in the future after his total hip replacement.

## 2024-04-09 NOTE — PHYSICAL EXAM
[LE] : Sensory: Intact in bilateral lower extremities [DP] : dorsalis pedis 2+ and symmetric bilaterally [Normal RLE] : Right Lower Extremity: No scars, rashes, lesions, ulcers, skin intact [Normal Touch] : sensation intact for touch [Normal] : no peripheral adenopathy appreciated [de-identified] : Examination of right knee: Inspection: There is neutral alignment noted there is no signs of trauma there is normal muscle tone Palpation: There is significant tenderness over the medial joint line of the right knee. Effusion: None bilateral Range of motion: 0-1 40 the right knee experiences significant pain with deep flexion Ligamentous exam: Stable with varus and valgus stress and anterior posterior drawer. Motor and sensory exam in the bilateral lower extremities is intact.  [de-identified] : MRI shows the following: Radial tear involving the posterior horn and body segment of the medial meniscus with a displaced meniscal flap overlying the anterior horn. Mild medial joint line synovitis. Mild medial tibiofemoral compartment arthrosis.  Mild to moderate lateral tibiofemoral and patellofemoral compartment arthrosis.  Mild to moderate tendinosis of the patellar tendon origin.  Small Baker's cyst.

## 2024-04-10 ENCOUNTER — TRANSCRIPTION ENCOUNTER (OUTPATIENT)
Age: 74
End: 2024-04-10

## 2024-04-10 ENCOUNTER — INPATIENT (INPATIENT)
Facility: HOSPITAL | Age: 74
LOS: 0 days | Discharge: HOME | DRG: 470 | End: 2024-04-11
Attending: ORTHOPAEDIC SURGERY | Admitting: ORTHOPAEDIC SURGERY
Payer: MEDICARE

## 2024-04-10 ENCOUNTER — APPOINTMENT (OUTPATIENT)
Dept: ORTHOPEDIC SURGERY | Facility: HOSPITAL | Age: 74
End: 2024-04-10

## 2024-04-10 VITALS
HEART RATE: 68 BPM | TEMPERATURE: 98 F | HEIGHT: 65 IN | RESPIRATION RATE: 21 BRPM | OXYGEN SATURATION: 98 % | WEIGHT: 142.2 LBS | DIASTOLIC BLOOD PRESSURE: 79 MMHG | SYSTOLIC BLOOD PRESSURE: 118 MMHG

## 2024-04-10 DIAGNOSIS — Z98.890 OTHER SPECIFIED POSTPROCEDURAL STATES: Chronic | ICD-10-CM

## 2024-04-10 DIAGNOSIS — Z96.642 PRESENCE OF LEFT ARTIFICIAL HIP JOINT: Chronic | ICD-10-CM

## 2024-04-10 DIAGNOSIS — M16.12 UNILATERAL PRIMARY OSTEOARTHRITIS, LEFT HIP: ICD-10-CM

## 2024-04-10 LAB — ABO RH CONFIRMATION: SIGNIFICANT CHANGE UP

## 2024-04-10 PROCEDURE — 27130 TOTAL HIP ARTHROPLASTY: CPT | Mod: AS,LT

## 2024-04-10 PROCEDURE — 99221 1ST HOSP IP/OBS SF/LOW 40: CPT

## 2024-04-10 PROCEDURE — 27130 TOTAL HIP ARTHROPLASTY: CPT | Mod: LT

## 2024-04-10 DEVICE — SCREW ACET SZ 6.5X30MM: Type: IMPLANTABLE DEVICE | Site: LEFT | Status: FUNCTIONAL

## 2024-04-10 DEVICE — SLEEVE BIOLOX DELTA OPTION SZ -3: Type: IMPLANTABLE DEVICE | Site: LEFT | Status: FUNCTIONAL

## 2024-04-10 DEVICE — BONE WAX 2.5GM: Type: IMPLANTABLE DEVICE | Site: LEFT | Status: FUNCTIONAL

## 2024-04-10 DEVICE — LINER ACET EMPOWR HXE PLUS NEUT 36MM ALPHA G: Type: IMPLANTABLE DEVICE | Site: LEFT | Status: FUNCTIONAL

## 2024-04-10 DEVICE — CUP ACET EMPOWR CLUSTER 54MM ALPHA G: Type: IMPLANTABLE DEVICE | Site: LEFT | Status: FUNCTIONAL

## 2024-04-10 DEVICE — STEM BLADE EMPOWER SZ 13 LAT 132 DEG: Type: IMPLANTABLE DEVICE | Site: LEFT | Status: FUNCTIONAL

## 2024-04-10 DEVICE — HEAD FEM OPTION CERAMIC DELTA 36MM: Type: IMPLANTABLE DEVICE | Site: LEFT | Status: FUNCTIONAL

## 2024-04-10 DEVICE — K-WIRE MICROAIRE (THREADED) SINGLE TROCAR 4.8MM X 9": Type: IMPLANTABLE DEVICE | Site: LEFT | Status: FUNCTIONAL

## 2024-04-10 RX ORDER — ACETAMINOPHEN 500 MG
1000 TABLET ORAL EVERY 8 HOURS
Refills: 0 | Status: DISCONTINUED | OUTPATIENT
Start: 2024-04-10 | End: 2024-04-11

## 2024-04-10 RX ORDER — CELECOXIB 200 MG/1
200 CAPSULE ORAL EVERY 12 HOURS
Refills: 0 | Status: DISCONTINUED | OUTPATIENT
Start: 2024-04-10 | End: 2024-04-11

## 2024-04-10 RX ORDER — ASPIRIN/CALCIUM CARB/MAGNESIUM 324 MG
81 TABLET ORAL EVERY 12 HOURS
Refills: 0 | Status: DISCONTINUED | OUTPATIENT
Start: 2024-04-11 | End: 2024-04-11

## 2024-04-10 RX ORDER — PANTOPRAZOLE SODIUM 20 MG/1
40 TABLET, DELAYED RELEASE ORAL
Refills: 0 | Status: DISCONTINUED | OUTPATIENT
Start: 2024-04-10 | End: 2024-04-11

## 2024-04-10 RX ORDER — SENNA PLUS 8.6 MG/1
2 TABLET ORAL AT BEDTIME
Refills: 0 | Status: DISCONTINUED | OUTPATIENT
Start: 2024-04-10 | End: 2024-04-11

## 2024-04-10 RX ORDER — SODIUM CHLORIDE 9 MG/ML
1000 INJECTION, SOLUTION INTRAVENOUS
Refills: 0 | Status: DISCONTINUED | OUTPATIENT
Start: 2024-04-10 | End: 2024-04-11

## 2024-04-10 RX ORDER — ASPIRIN/CALCIUM CARB/MAGNESIUM 324 MG
1 TABLET ORAL
Qty: 56 | Refills: 0
Start: 2024-04-10 | End: 2024-05-07

## 2024-04-10 RX ORDER — SODIUM CHLORIDE 9 MG/ML
500 INJECTION INTRAMUSCULAR; INTRAVENOUS; SUBCUTANEOUS ONCE
Refills: 0 | Status: COMPLETED | OUTPATIENT
Start: 2024-04-10 | End: 2024-04-10

## 2024-04-10 RX ORDER — HYDROMORPHONE HYDROCHLORIDE 2 MG/ML
0.2 INJECTION INTRAMUSCULAR; INTRAVENOUS; SUBCUTANEOUS
Refills: 0 | Status: DISCONTINUED | OUTPATIENT
Start: 2024-04-10 | End: 2024-04-10

## 2024-04-10 RX ORDER — OXYCODONE HYDROCHLORIDE 5 MG/1
5 TABLET ORAL
Refills: 0 | Status: DISCONTINUED | OUTPATIENT
Start: 2024-04-10 | End: 2024-04-11

## 2024-04-10 RX ORDER — POLYETHYLENE GLYCOL 3350 17 G/17G
17 POWDER, FOR SOLUTION ORAL
Qty: 0 | Refills: 0 | DISCHARGE
Start: 2024-04-10

## 2024-04-10 RX ORDER — TRANEXAMIC ACID 100 MG/ML
1000 INJECTION, SOLUTION INTRAVENOUS ONCE
Refills: 0 | Status: COMPLETED | OUTPATIENT
Start: 2024-04-10 | End: 2024-04-10

## 2024-04-10 RX ORDER — ACETAMINOPHEN 500 MG
1000 TABLET ORAL ONCE
Refills: 0 | Status: COMPLETED | OUTPATIENT
Start: 2024-04-10 | End: 2024-04-10

## 2024-04-10 RX ORDER — HYDROMORPHONE HYDROCHLORIDE 2 MG/ML
0.5 INJECTION INTRAMUSCULAR; INTRAVENOUS; SUBCUTANEOUS
Refills: 0 | Status: DISCONTINUED | OUTPATIENT
Start: 2024-04-10 | End: 2024-04-10

## 2024-04-10 RX ORDER — CEFAZOLIN SODIUM 1 G
2000 VIAL (EA) INJECTION ONCE
Refills: 0 | Status: COMPLETED | OUTPATIENT
Start: 2024-04-10 | End: 2024-04-10

## 2024-04-10 RX ORDER — OMEPRAZOLE 10 MG/1
1 CAPSULE, DELAYED RELEASE ORAL
Qty: 28 | Refills: 0
Start: 2024-04-10 | End: 2024-05-07

## 2024-04-10 RX ORDER — HYDROMORPHONE HYDROCHLORIDE 2 MG/ML
0.5 INJECTION INTRAMUSCULAR; INTRAVENOUS; SUBCUTANEOUS
Refills: 0 | Status: DISCONTINUED | OUTPATIENT
Start: 2024-04-10 | End: 2024-04-11

## 2024-04-10 RX ORDER — ACETAMINOPHEN 500 MG
2 TABLET ORAL
Qty: 0 | Refills: 0 | DISCHARGE
Start: 2024-04-10

## 2024-04-10 RX ORDER — SENNA PLUS 8.6 MG/1
2 TABLET ORAL
Qty: 0 | Refills: 0 | DISCHARGE
Start: 2024-04-10

## 2024-04-10 RX ORDER — OXYCODONE HYDROCHLORIDE 5 MG/1
10 TABLET ORAL
Refills: 0 | Status: DISCONTINUED | OUTPATIENT
Start: 2024-04-10 | End: 2024-04-11

## 2024-04-10 RX ORDER — DEXAMETHASONE 0.5 MG/5ML
8 ELIXIR ORAL ONCE
Refills: 0 | Status: COMPLETED | OUTPATIENT
Start: 2024-04-11 | End: 2024-04-11

## 2024-04-10 RX ORDER — POLYETHYLENE GLYCOL 3350 17 G/17G
17 POWDER, FOR SOLUTION ORAL AT BEDTIME
Refills: 0 | Status: DISCONTINUED | OUTPATIENT
Start: 2024-04-10 | End: 2024-04-11

## 2024-04-10 RX ORDER — CEFAZOLIN SODIUM 1 G
2000 VIAL (EA) INJECTION EVERY 8 HOURS
Refills: 0 | Status: COMPLETED | OUTPATIENT
Start: 2024-04-10 | End: 2024-04-10

## 2024-04-10 RX ORDER — MAGNESIUM HYDROXIDE 400 MG/1
30 TABLET, CHEWABLE ORAL DAILY
Refills: 0 | Status: DISCONTINUED | OUTPATIENT
Start: 2024-04-10 | End: 2024-04-11

## 2024-04-10 RX ORDER — SODIUM CHLORIDE 9 MG/ML
1000 INJECTION, SOLUTION INTRAVENOUS
Refills: 0 | Status: DISCONTINUED | OUTPATIENT
Start: 2024-04-10 | End: 2024-04-10

## 2024-04-10 RX ORDER — CELECOXIB 200 MG/1
1 CAPSULE ORAL
Qty: 56 | Refills: 0
Start: 2024-04-10 | End: 2024-05-07

## 2024-04-10 RX ORDER — ONDANSETRON 8 MG/1
4 TABLET, FILM COATED ORAL EVERY 6 HOURS
Refills: 0 | Status: DISCONTINUED | OUTPATIENT
Start: 2024-04-10 | End: 2024-04-11

## 2024-04-10 RX ORDER — ONDANSETRON 8 MG/1
4 TABLET, FILM COATED ORAL ONCE
Refills: 0 | Status: DISCONTINUED | OUTPATIENT
Start: 2024-04-10 | End: 2024-04-10

## 2024-04-10 RX ADMIN — Medication 400 MILLIGRAM(S): at 15:08

## 2024-04-10 RX ADMIN — Medication 1000 MILLIGRAM(S): at 16:43

## 2024-04-10 RX ADMIN — Medication 100 MILLIGRAM(S): at 23:57

## 2024-04-10 RX ADMIN — SODIUM CHLORIDE 500 MILLILITER(S): 9 INJECTION INTRAMUSCULAR; INTRAVENOUS; SUBCUTANEOUS at 11:51

## 2024-04-10 RX ADMIN — Medication 100 MILLIGRAM(S): at 15:38

## 2024-04-10 RX ADMIN — Medication 1000 MILLIGRAM(S): at 21:18

## 2024-04-10 RX ADMIN — SODIUM CHLORIDE 75 MILLILITER(S): 9 INJECTION, SOLUTION INTRAVENOUS at 11:51

## 2024-04-10 RX ADMIN — Medication 1000 MILLIGRAM(S): at 21:14

## 2024-04-10 RX ADMIN — SODIUM CHLORIDE 500 MILLILITER(S): 9 INJECTION INTRAMUSCULAR; INTRAVENOUS; SUBCUTANEOUS at 18:02

## 2024-04-10 RX ADMIN — SODIUM CHLORIDE 125 MILLILITER(S): 9 INJECTION, SOLUTION INTRAVENOUS at 15:08

## 2024-04-10 RX ADMIN — SODIUM CHLORIDE 125 MILLILITER(S): 9 INJECTION, SOLUTION INTRAVENOUS at 23:56

## 2024-04-10 RX ADMIN — CELECOXIB 200 MILLIGRAM(S): 200 CAPSULE ORAL at 21:18

## 2024-04-10 RX ADMIN — CELECOXIB 200 MILLIGRAM(S): 200 CAPSULE ORAL at 21:14

## 2024-04-10 NOTE — PHYSICAL THERAPY INITIAL EVALUATION ADULT - PRECAUTIONS/LIMITATIONS, REHAB EVAL
left hip precautions/surgical precautions anterior/fall precautions/left hip precautions/surgical precautions

## 2024-04-10 NOTE — CARE COORDINATION ASSESSMENT. - NSCAREPROVIDERS_GEN_ALL_CORE_FT
CARE PROVIDERS:  Administration: Ania Thornton  Administration: Pasha Sawyer  Admitting: Jorge A Webb  Attending: Jorge A Webb  Consultant: Keagan Rodriguez  Nurse: Tresa Portillo  Nurse: Noy Jenkins  Nurse: Ana Strange  Nurse: Katty Taylor  Nurse: Alexia Mata  Nurse: Kasey Walker  Nurse: Toma Kimble  Nurse: Gill Knapp  Occupational Therapy: Clara Hay  Override: Ana Strange  Physical Therapy: Rubi Dennis  Physical Therapy: Kelsey Ram  Physical Therapy: Harleen Doyle  Primary Team: Evelyn Cope  Primary Team: Wilmar Polk  Primary Team: Raciel Soriano  Primary Team: Josue Bella  Primary Team: Kaleigh Tidwell  Student: Tiana Villeda  Team: DANIEL  Hospitalists, Team  Technician: Sue Sanchez// Supp. Assoc.: Amy Swift

## 2024-04-10 NOTE — OCCUPATIONAL THERAPY INITIAL EVALUATION ADULT - LIVES WITH, PROFILE
Pt lives alone in an apartment, 5+15 steps to enter with a tub. Pt was independent with ADLs, IADLs, functional mobility/transfers prior to admission without AD. Pt owns a RW and commode. Pt reports his friend or son will be available to assist upon discharge./alone

## 2024-04-10 NOTE — OCCUPATIONAL THERAPY INITIAL EVALUATION ADULT - NS ASR FOLLOW COMMAND OT EVAL
Call attempt 1 on 5/20 @ 1:07. Left VM. HBR   100% of the time/able to follow multistep instructions

## 2024-04-10 NOTE — PHYSICAL THERAPY INITIAL EVALUATION ADULT - GAIT TRAINING, PT EVAL
1-2 days: pt will amb 150 ft independently with RW 3-5 sessions: pt will amb 150 ft independently with RW

## 2024-04-10 NOTE — CARE COORDINATION ASSESSMENT. - NSPASTMEDSURGHISTORY_GEN_ALL_CORE_FT
PAST MEDICAL & SURGICAL HISTORY:  History of removal of cyst      H/O repair of left rotator cuff      H/O microdiscectomy      H/O medial meniscus repair of left knee      H/O right inguinal hernia repair      Osteoarthritis of left hip      Hopi (hard of hearing)      History of circumcision

## 2024-04-10 NOTE — DISCHARGE NOTE PROVIDER - NSDCMRMEDTOKEN_GEN_ALL_CORE_FT
mupirocin 2% topical ointment: Apply topically to affected area 2 times a day Apply 2x day for 5 days   cefadroxil 500 mg oral capsule: 1 cap(s) orally 2 times a day Take until completed  CeleBREX 200 mg oral capsule: 1 cap(s) orally every 12 hours Take 2 hours after Aspirin  Ecotrin Adult Low Strength 81 mg oral delayed release tablet: 1 tab(s) orally every 12 hours Take 2 hours before celebrex  omeprazole 20 mg oral delayed release capsule: 1 cap(s) orally once a day  oxyCODONE 5 mg oral tablet: 1 tab(s) orally every 4 hours as needed for  moderate pain 1 tab by mouth as needed for moderate to severe postoperative pain.  [facility]#[id number] MDD: 6  polyethylene glycol 3350 oral powder for reconstitution: 17 gram(s) orally once a day (at bedtime)  senna leaf extract oral tablet: 2 tab(s) orally once a day (at bedtime)  Tylenol Extra Strength 500 mg oral tablet: 2 tab(s) orally every 8 hours

## 2024-04-10 NOTE — DISCHARGE NOTE PROVIDER - NSDCCPTREATMENT_GEN_ALL_CORE_FT
PRINCIPAL PROCEDURE  Procedure: Total replacement of left hip joint by anterior approach  Findings and Treatment: Severe DJD  left hip

## 2024-04-10 NOTE — BRIEF OPERATIVE NOTE - NSICDXBRIEFPROCEDURE_GEN_ALL_CORE_FT
PROCEDURES:  Total replacement of left hip joint by anterior approach 10-Apr-2024 09:45:24  Wilmar Polk

## 2024-04-10 NOTE — PHYSICAL THERAPY INITIAL EVALUATION ADULT - PERTINENT HX OF CURRENT PROBLEM, REHAB EVAL
Pt is a 73 y/o male s/p left anterior total hip replacement. Pt is a 75 y/o male s/p left anterior total hip replacement 4/10/24

## 2024-04-10 NOTE — DISCHARGE NOTE PROVIDER - NSDCFUSCHEDAPPT_GEN_ALL_CORE_FT
Jorge A Webb  [facility]  [facility] [address] [facility]  Scheduled Appointment: 04/10/2024    Merlyn Holloway  [facility]  ORTHOSURG [address] [facility]  Scheduled Appointment: 04/25/2024    Jorge A Webb  [facility]  ORTHOSURG [address] [facility]  Scheduled Appointment: 06/11/2024     Merlyn Holloway  [facility]  ORTHOSURG [address] [facility]  Scheduled Appointment: 04/25/2024    Jorge A Webb  [facility]  ORTHOSURG [address] [place] [address]  Scheduled Appointment: 06/11/2024

## 2024-04-10 NOTE — PHYSICAL THERAPY INITIAL EVALUATION ADULT - TRANSFER TRAINING, PT EVAL
1-2 days: pt will be independent sit <-> stand with RW 3-5 sessions: pt will be independent sit <-> stand with RW

## 2024-04-10 NOTE — DISCHARGE NOTE NURSING/CASE MANAGEMENT/SOCIAL WORK - NSSCNAMETXT_GEN_ALL_CORE
[facility] care agency [phone] will reach out to you within 24-72 hours of your discharge to schedule home care visit/eval appointment with you. Please call agency for any queries regarding home care services

## 2024-04-10 NOTE — DISCHARGE NOTE NURSING/CASE MANAGEMENT/SOCIAL WORK - PATIENT PORTAL LINK FT
You can access the FollowMyHealth Patient Portal offered by [facility] by registering at the following website: [email or web address]. By joining [employer]’s FollowMyHealth portal, you will also be able to view your health information using other applications (apps) compatible with our system.

## 2024-04-10 NOTE — CARE COORDINATION ASSESSMENT. - LIVES WITH, PROFILE
Last refill 09-18-16  
Request to refill ASA 81 mg. Patient has not been seen in over a year. Appears is getting refills through other provider. Called pharmacy and they will send to other provider. Maryann Elliott RN    
alone

## 2024-04-10 NOTE — DISCHARGE NOTE PROVIDER - NSDCCPCAREPLAN_GEN_ALL_CORE_FT
PRINCIPAL DISCHARGE DIAGNOSIS  Diagnosis: Osteoarthritis of left hip  Assessment and Plan of Treatment: You have had an Anterior Total Hip Replacement. Follow instructions given to you by your surgeon.   PT/OT Total Hip Protocol; Ambulation, transfers, stairs, & ADLs  Full weight bearing both legs; Walker/cane use as instructed by PT/OT  Anterior THR precautions for 4 weeks: No straight leg raise; No external rotation of hip when extended-standing or lying flat; No hyperextension of hip when standing (kickback)  • Ice 30 minutes several times daily with at least a 60 minute break in between icing sessions, protect skin with thin cloth barrier  [place] dressing is over your hip wound.  It is waterproof and you may shower.  Do not aim shower stream at surgical site and pat dry with clean towel after showering.   Remove Silver[place] dressing 7 days after surgery and leave wound open to air.    If incision is dry, it may be left open to air.  Keep incision clean. DO NOT APPLY ANYTHING to incision site (salves/ointments/creams).  May shower post-op if no drainage from incision.  Do not scrub incision site. Pat dry after shower.  Prineo tape  removal on/near after Post Op Day # 14 at follow-up appointment with surgeon.        PRINCIPAL DISCHARGE DIAGNOSIS  Diagnosis: Osteoarthritis of left hip  Assessment and Plan of Treatment: You have had an Anterior Total Hip Replacement. Follow instructions given to you by your surgeon.   PT/OT Total Hip Protocol; Ambulation, transfers, stairs, & ADLs  Full weight bearing both legs; Walker/cane use as instructed by PT/OT  Anterior THR precautions for 4 weeks: No straight leg raise; No external rotation of hip when extended-standing or lying flat; No hyperextension of hip when standing (kickback)  • Ice 30 minutes several times daily with at least a 60 minute break in between icing sessions, protect skin with thin cloth barrier  [place] dressing is over your hip wound.  It is waterproof and you may shower.  Do not aim shower stream at surgical site and pat dry with clean towel after showering.   Remove Silverlon dressing 7 days after surgery and leave wound open to air.    If incision is dry, it may be left open to air.  Keep incision clean. DO NOT APPLY ANYTHING to incision site (salves/ointments/creams).  May shower post-op if no drainage from incision.  Do not scrub incision site. Pat dry after shower.  Prineo tape  removal on/near after Post Op Day # 14 at follow-up appointment with surgeon.   If you cannot tolerate the celebrex despite use of omeprazole stop taking it and call the office.

## 2024-04-10 NOTE — PROGRESS NOTE ADULT - SUBJECTIVE AND OBJECTIVE BOX
Discharge medication calendar:  ASA EC 81mg q12h x 4 weeks  Omeprazole 20mg QAM x 4 weeks  Celecoxib 200mg q12h x 2-3 weeks  APAP 1000mg q8h x 2-3 weeks  Narcotic PRN  Docusate 100mg TID while taking narcotic  Miralax, Senna, or Bisacodyl PRN for treatment of constipation  Cefadroxil 500 mg q12h x 7 days

## 2024-04-10 NOTE — PHYSICAL THERAPY INITIAL EVALUATION ADULT - RANGE OF MOTION EXAMINATION, REHAB EVAL
left LE impairment secondary to surgery/bilateral upper extremity ROM was WFL (within functional limits)/Right LE ROM was WFL (within functional limits)

## 2024-04-10 NOTE — PROGRESS NOTE ADULT - SUBJECTIVE AND OBJECTIVE BOX
Ortho PA - Post Op Check - S/P - Left Anterior THR         Pt alert and comfortable with no complaints, pain controlled  Denies nausea     Vital Signs Last 24 Hrs  T(C): 36.9 (04-10-24 @ 12:40), Max: 36.9 (04-10-24 @ 12:40)  T(F): 98.5 (04-10-24 @ 12:40), Max: 98.5 (04-10-24 @ 12:40)  HR: 74 (04-10-24 @ 12:40) (62 - 93)  BP: 116/73 (04-10-24 @ 12:40) (93/76 - 118/73)  BP(mean): --  RR: 18 (04-10-24 @ 12:40) (14 - 18)  SpO2: 95% (04-10-24 @ 12:40) (95% - 99%)  I&O's Detail    10 Apr 2024 07:01  -  10 Apr 2024 12:55  --------------------------------------------------------  IN:    Lactated Ringers: 1250 mL  Total IN: 1250 mL    OUT:    Blood Loss (mL): 250 mL  Total OUT: 250 mL    Total NET: 1000 mL        I&O's Summary    10 Apr 2024 07:01  -  10 Apr 2024 12:55  --------------------------------------------------------  IN: 1250 mL / OUT: 250 mL / NET: 1000 mL                       MEDICATIONS:acetaminophen     Tablet .. 1000 milliGRAM(s) Oral every 8 hours  acetaminophen   IVPB .. 1000 milliGRAM(s) IV Intermittent once  aprepitant 40 milliGRAM(s) Oral once  ceFAZolin   IVPB 2000 milliGRAM(s) IV Intermittent every 8 hours  celecoxib 200 milliGRAM(s) Oral every 12 hours  chlorhexidine 2% Cloths 1 Application(s) Topical once  HYDROmorphone  Injectable 0.5 milliGRAM(s) IV Push every 3 hours PRN  lactated ringers. 1000 milliLiter(s) IV Continuous <Continuous>  magnesium hydroxide Suspension 30 milliLiter(s) Oral daily PRN  ondansetron Injectable 4 milliGRAM(s) IV Push every 6 hours PRN  oxyCODONE    IR 5 milliGRAM(s) Oral every 3 hours PRN  oxyCODONE    IR 10 milliGRAM(s) Oral every 3 hours PRN  pantoprazole    Tablet 40 milliGRAM(s) Oral before breakfast  polyethylene glycol 3350 17 Gram(s) Oral at bedtime  senna 2 Tablet(s) Oral at bedtime  sodium chloride 0.9% Bolus 500 milliLiter(s) IV Bolus once    Anticoagulation:  Aspirin 81mg q 12 h    Antibiotics:   ceFAZolin   IVPB 2000 milliGRAM(s) IV Intermittent every 8 hours      Pain medications:   acetaminophen     Tablet .. 1000 milliGRAM(s) Oral every 8 hours  acetaminophen   IVPB .. 1000 milliGRAM(s) IV Intermittent once  aprepitant 40 milliGRAM(s) Oral once  celecoxib 200 milliGRAM(s) Oral every 12 hours  HYDROmorphone  Injectable 0.5 milliGRAM(s) IV Push every 3 hours PRN  ondansetron Injectable 4 milliGRAM(s) IV Push every 6 hours PRN  oxyCODONE    IR 5 milliGRAM(s) Oral every 3 hours PRN  oxyCODONE    IR 10 milliGRAM(s) Oral every 3 hours PRN          PE:  Primary surgical ([place]) bandage dry and intact. Feet mobile and sensate.  EHLs/ant.tibs. 5/5  PAS on LE's.  Calves soft and nontender.    A/P: Ortho stable  - Continue post-op orders; pain management with Celebrex, Oxycodone, Dilaudid, Acetaminophen  - Check labs in A.M.  - DVT prevention with Aspirin 81mg q 12 h  - PT /OT for OOB, full WBAT  - Medical consult  -Discharge planning  -Will continue to monitor closely with attendings.

## 2024-04-10 NOTE — PATIENT CHOICE NOTE. - NSPTCHOICESTATE_GEN_ALL_CORE

## 2024-04-10 NOTE — CONSULT NOTE ADULT - SUBJECTIVE AND OBJECTIVE BOX
HPI:  75 yo male, pmh of OA, presenting for left hip replacement, seen on surgical floor, denies pain, dizziness, nausea, vomiting, moving lower extremities after anesthesia    PAST MEDICAL & SURGICAL HISTORY:  [place] (hard of hearing)      Osteoarthritis of left hip      H/O right inguinal hernia repair      H/O medial meniscus repair of left knee      H/O microdiscectomy      H/O repair of left rotator cuff      History of removal of cyst      History of circumcision          ANTIMICROBIAL:  ceFAZolin   IVPB 2000 milliGRAM(s) IV Intermittent every 8 hours    CARDIOVASCULAR:    PULMONARY:    NEUROLOGIC:  acetaminophen     Tablet .. 1000 milliGRAM(s) Oral every 8 hours  aprepitant 40 milliGRAM(s) Oral once  celecoxib 200 milliGRAM(s) Oral every 12 hours  HYDROmorphone  Injectable 0.5 milliGRAM(s) IV Push every 3 hours PRN  ondansetron Injectable 4 milliGRAM(s) IV Push every 6 hours PRN  oxyCODONE    IR 5 milliGRAM(s) Oral every 3 hours PRN  oxyCODONE    IR 10 milliGRAM(s) Oral every 3 hours PRN    ONCOLOGIC:    HEMATOLOGIC:    GATROINTESTINAL:  magnesium hydroxide Suspension 30 milliLiter(s) Oral daily PRN  pantoprazole    Tablet 40 milliGRAM(s) Oral before breakfast  polyethylene glycol 3350 17 Gram(s) Oral at bedtime  senna 2 Tablet(s) Oral at bedtime     MEDS:    ENDO/METABOLIC:    IV FLUID/NUTRITION:  lactated ringers. 1000 milliLiter(s) IV Continuous <Continuous>  sodium chloride 0.9% Bolus 500 milliLiter(s) IV Bolus once    TOPICAL:  chlorhexidine 2% Cloths 1 Application(s) Topical once    IMMUNOLOGIC & OTHER        Allergies    No Known Allergies    Intolerances      PAST MEDICAL & SURGICAL HISTORY:  [place] (hard of hearing)      Osteoarthritis of left hip      H/O right inguinal hernia repair      H/O medial meniscus repair of left knee      H/O microdiscectomy      H/O repair of left rotator cuff      History of removal of cyst      History of circumcision        SOCIAL HISTORY:    FAMILY HISTORY:  FH: thyroid cancer (Sibling)    FH: leukemia (Child)        Vital Signs Last 24 Hrs  T(C): 36.4 (10 Apr 2024 15:29), Max: 36.9 (10 Apr 2024 12:40)  T(F): 97.5 (10 Apr 2024 15:29), Max: 98.5 (10 Apr 2024 12:40)  HR: 85 (10 Apr 2024 15:29) (62 - 93)  BP: 111/69 (10 Apr 2024 15:29) (93/76 - 118/79)  BP(mean): --  RR: 17 (10 Apr 2024 15:29) (14 - 21)  SpO2: 96% (10 Apr 2024 15:29) (95% - 99%)    Parameters below as of 10 Apr 2024 12:40  Patient On (Oxygen Delivery Method): room air          I&O's Detail    10 Apr 2024 07:01  -  10 Apr 2024 16:12  --------------------------------------------------------  IN:    Lactated Ringers: 1250 mL  Total IN: 1250 mL    OUT:    Blood Loss (mL): 250 mL  Total OUT: 250 mL    Total NET: 1000 mL        Daily Height in cm: 165.1 (10 Apr 2024 06:24)    Daily     REVIEW OF SYSTEMS:    as per hpi, other systems reviewed and are negative    PHYSICAL EXAM:    GENERAL: NAD, well-groomed, well-developed  HEAD:  Atraumatic, Normocephalic  EYES: EOMI, PERRLA, conjunctiva and sclera clear  ENMT: No tonsillar erythema, exudates, or enlargement; Moist mucous membranes, No lesions  NECK: Supple, No JVD, Normal thyroid  NERVOUS SYSTEM:  Alert & Oriented X3, Good concentration; no focal deficits  CHEST/LUNG: Clear to auscultation bilaterally; No rales, rhonchi, wheezing, or rubs  HEART: Regular rate and rhythm; No murmurs, rubs, or gallops  ABDOMEN: Soft, Nontender, Nondistended; Bowel sounds present  EXTREMITIES:  2+ Peripheral Pulses, left hip site c/d/i  LYMPH: No lymphadenopathy   SKIN: No rashes or lesions      LABS:                      RADIOLOGY & ADDITIONAL STUDIES:

## 2024-04-10 NOTE — OCCUPATIONAL THERAPY INITIAL EVALUATION ADULT - LIGHT TOUCH SENSATION, LLE, REHAB EVAL
"OR nurse went to go get pt for procedure and noticed IV infiltrated with swollen arm and leaking IV. Pt requested to speak with charge nurse, seems agitated and states he "doesn't know if I want my procedure done". Pt requests to speak with Dr. Ann and changed mind and wanted to leave. Pt provided with ice pack to right arm. Pt left in agitated mood with wife at side.   "
within normal limits

## 2024-04-10 NOTE — PHYSICAL THERAPY INITIAL EVALUATION ADULT - MANUAL MUSCLE TESTING RESULTS, REHAB EVAL
[facility]  Progress Note and Procedure Note      Durga Padilla  MEDICAL RECORD NUMBER:  [id number]  AGE: 48 y.o. GENDER: male  : 1964  EPISODE DATE:  2017    Subjective:       Chief Complaint   Patient presents with    Wound Check     f/u right heel         HISTORY of PRESENT ILLNESS HPI     Durga Padilla is a 48 y.o. male who presents today for wound evaluation. History of Wound Context:  RIGHT posterior heel wound diabetic neuropathic. He had an angiogram with intervention per Dr. Db Zheng.    Wound Pain Timing/Severity: none  Quality of pain: N/A  Severity:  0 / 10   Modifying Factors: None  Associated Signs/Symptoms: drainage and numbness    Ulcer Identification:  Ulcer Type: diabetic and neuropathic  Contributing Factors: edema, diabetes, poor glucose control, shear force and arterial insufficiency          PAST MEDICAL HISTORY        Diagnosis Date    Clostridium difficile diarrhea 2017    PCR    Diabetes mellitus ([address]     Hyperlipidemia     Hypertension     MDRO (multiple drug resistant organisms) resistance 2017    leg    MRSA (methicillin resistant staph aureus) culture positive 06/15/2016    foot; bone; blood; chest abscess    MRSA (methicillin resistant staph aureus) culture positive 2017    foot    [facility] foot [facility]     Type II or unspecified type diabetes mellitus without mention of complication, not stated as uncontrolled     VRE (vancomycin resistant enterococcus) culture positive 8/25/15    foot       PAST SURGICAL HISTORY    Past Surgical History:   Procedure Laterality Date    ABSCESS DRAINAGE Left 2016    Dr. Radha Ocasio - chest wall, I&D of phlegmon, placement of wound vac    FEMORAL-TIBIAL BYPASS GRAFT Left 2015    FIRST RIB REMOVAL Left 2016    Dr. Radha Ocasio - via trans-axillary approach    FOOT DEBRIDEMENT Left 2015    FOOT DEBRIDEMENT Left 2015    FOOT DEBRIDEMENT Left 09/11/2015    w/delayed primary closure    FOOT SURGERY Left 4/2/2015    INCISION AND DRAINAGE LEFT FOOT WITH DEBRIDEMENT    FOOT SURGERY Left 08/11/2015    TRANSMETATARSAL OSTEOTOMY LEFT FOOT       FOOT SURGERY Left [id number]    FOOT SURGERY Left 12/07/2015    INCISION AND DRAINAGE LEFT FOOT TO BONE AND CORTEX WITH    OTHER SURGICAL HISTORY  4/10/15    LEFT SUPERFICIAL FEMORAL ARTERY TO POSTERIOR TIBIAL ARTERY IN    OTHER SURGICAL HISTORY Left 5/11/215    INCISION AND DEBRIDEMENT LEFT INNER THIGH WOUND AND LEFT CALF    OTHER SURGICAL HISTORY  5/19/2015    INCISION AND DRAINAGE WITH REMOVAL OF NECROTIC SOFT TISSUE    OTHER SURGICAL HISTORY  07/03/2016    FORMAL PARTIAL 1ST RAY AMPUTATION, INCISION AND DRAINAGE DOWN    THROMBOENDARTERECTOMY Left 9/9/2015    POPLITEAL TO TIBIAL ENDARETCTOMY; ABORTED FEM- POP BYPASS    TOE AMPUTATION  09-    left great toe amputation       FAMILY HISTORY    Family History   Problem Relation Age of Onset    High Blood Pressure Mother     Diabetes Mother     Diabetes Sister     Diabetes Brother        SOCIAL HISTORY    Social History   Substance Use Topics    Smoking status: Current Some Day Smoker     Types: Cigars     Last attempt to quit: 12/21/2015    Smokeless tobacco: Never Used      Comment: only smokes cigars a couple times a week    Alcohol use No      Comment: rare       ALLERGIES    Allergies   Allergen Reactions    Toradol [Ketorolac Tromethamine] Nausea And Vomiting     Nausea/emesis, near syncopal past 2x       MEDICATIONS    Current Outpatient Prescriptions on File Prior to Encounter   Medication Sig Dispense Refill    insulin glargine (LANTUS) 100 UNIT/ML injection vial Inject 35 Units into the skin nightly 1 vial 3    aspirin 81 MG tablet Take 81 mg by mouth daily      acetaminophen (AMINOFEN) 325 MG tablet Take 2 tablets by mouth every 6 hours as needed for Pain 120 tablet 3    gabapentin (NEURONTIN) 100 MG capsule Take 2 capsules by mouth 3 bilat UE/right LE grossly >= 4/5; left LE  grossly >= 3+/5/grossly assessed due to bilateral lower extremities. TMA noted on the left lower extremity with no open wounds noted. Wound noted on the posterior aspect of the right lower extremity. Wound has fibrotic and nonviable tissue that extends down through and includes the subcutaneous tissue/muscle/fascia. After debridement the wound has a fibrous base. There is  surrounding erythema and warmth noted and there is persistent malodor noted. The wound does not probe or track to bone.         Assessment:     Patient Active Problem List   Diagnosis    Osteomyelitis ([address]    Dyslipidemia    Carotid bruit present    S/P amputation    Abscess of third toe of left foot    Diabetic neuropathy (HCC)    [facility]cer [facility] toe of left foot [facility]    Onychomycosis    Pain of right heel    PVD (peripheral vascular disease) ([address]    Abscess of foot    Acute renal failure (ARF) ([address]    Dehydration with hyponatremia    Diabetic infection of right foot ([address]    TUTU (acute kidney injury) ([address]    Uncontrolled type 1 diabetes mellitus ([address]    Hypertension    Non compliance w medication regimen    PAD (peripheral artery disease) ([address]    History of osteomyelitis    Anemia    PAD (peripheral artery disease) ([facility]    Foot ulcer ([address]    Diabetic foot ulcer ([address]    Atherosclerosis of native arteries of the extremities with ulceration ([address]    Foot osteomyelitis ([address]    Edema of lower extremity    Wound infection    Wound dehiscence    Post-op pain    Osteomyelitis of left foot ([address]    Surgical wound dehiscence    VRE infection (vancomycin resistant Enterococcus)    Pseudomonas infection    Type 2 diabetes mellitus with diabetic polyneuropathy (HCC)    Type 1 diabetes mellitus with diabetic peripheral angiopathy with gangrene ([address]    Diabetic foot ulcer with osteomyelitis ([address]    Atherosclerosis of native artery of left lower extremity with ulceration of calf ([facility])    Failure of artificial skin graft    Pain management contract agreement    Diabetic ulcer of left foot associated with type 2 diabetes mellitus ([address]    Atherosclerosis of native artery of right lower extremity with ulceration of calf (HCC)    Sepsis ([address]    Left-sided chest wall pain    Essential hypertension    Pure hypercholesterolemia    Chronic osteomyelitis of left foot (HCC)    Elevated troponin    Transaminitis    Pleural effusion    Chest wall abscess    Pneumonia of both lower lobes due to infectious organism    Abscess of chest wall    Osteomyelitis of thoracic region ([address]    Pyogenic inflammation of bone ([address]    Toe gangrene ([address]    Diabetic ketoacidosis without coma associated with type 2 diabetes mellitus ([address]    Diabetic ketoacidosis with coma associated with type 1 diabetes mellitus ([address]    Somnolence    Acute right-sided thoracic back pain    Depression    Encounter for palliative care    Dysphagia    Acute hypoxemic respiratory failure (HCC)    Acute septic pulmonary embolism without acute cor pulmonale (HCC)    MRSA bacteremia    Pain    Encephalopathy acute    Constipation    Acute biliary pancreatitis without infection or necrosis    Fever    Idiopathic acute pancreatitis without infection or necrosis    Chronic skin ulcer, limited to breakdown of skin ([address]         Excisional Debridement Procedure Note  Indications:  Based on my examination of this patient's wound(s)/ulcer(s) today, debridement is required to promote healing and evaluate the wound base. Performed by: Vickie Lanier DPM    Consent obtained? Yes    Time out taken: Yes    Pain Control: Anesthetic: 4% Lidocaine Liquid Topical     Debridement: Excisional Debridement    Using curette, #15 blade scalpel, scissors and forceps the wound/ulcer was sharply debrided    down through and including the removal of  epidermis, dermis, subcutaneous tissue and muscle/fascia.         Devitalized Tissue Debrided:  fibrin, slough and necrotic/eschar      Pre Debridement Measurements:  Are located in the [place]  Documentation Flow Sheet   Wound/Ulcer #: 10     Post  Debridement Measurements:    Pressure Ulcer 07/13/17 Heel Lateral;Right Diabetic #10 (unknow when aquired) Davis grade 2 (Active)   Allyson-wound Assessment Brown;Dark edges;Dry;Maceration 12/12/2017  1:24 PM   Allyson-Wound Moisture Dry;Macerated 12/12/2017  1:24 PM   Allyson-Wound Color Kathryn Spanner; White 12/12/2017  1:24 PM   Pressure Ulcer Staging Stage III 10/10/2017  2:14 PM   Non-staged Wound Description Full thickness 12/12/2017  1:24 PM   Wound Assessment Red;Slough; Yellow 12/12/2017  1:24 PM   Wound Length (cm) 4 cm 12/12/2017  1:24 PM   Wound Width (cm) 5 cm 12/12/2017  1:24 PM   Wound Depth (cm)  0..6 12/12/2017  1:24 PM   Calculated Wound Size (cm^2) (l*w) 17.5 cm^2 12/12/2017  1:24 PM   Change in Wound Size % (l*w) -386.11 12/12/2017  1:24 PM   Dressing/Treatment Other (Comment) 12/12/2017  1:24 PM   Wound Cleansed Rinsed/Irrigated with saline 12/12/2017  1:24 PM   Drainage Amount Moderate 12/12/2017  1:24 PM   Drainage Description Serosanguinous 12/12/2017  1:24 PM   Odor Mild 12/12/2017  1:24 PM   Distance Tunneling (cm) 0 cm 12/12/2017  1:24 PM   Tunneling Position ___ O'Clock 0 10/10/2017  2:14 PM   Tunneling [place] Distance (cm) 0 11/28/2017  3:19 PM   Undermining Starts ___ O'Clock 0 10/10/2017  2:14 PM   Undermining Ends___ O'Clock 1400 8/4/2017  2:29 PM   Undermining Maxium Distance (cm) 0 12/12/2017  1:24 PM   [place]%Wound Bed 5 9/26/2017  1:33 PM   Red%Wound Bed 70 12/12/2017  1:24 PM   Yellow%Wound Bed 30 12/12/2017  1:24 PM   Black%Wound Bed 5 9/26/2017  1:33 PM   Margins Defined edges 10/10/2017  2:14 PM   Number of days: 151     Percent of Wound Debrided: 100%    Total Surface Area Debrided:  20 sq cm     Diabetic/Pressure/Non Pressure Ulcers:  Ulcer: Diabetic ulcer, muscle necrosis    Bleeding:  Minimal    Hemostasis Achieved:  by pressure    Procedural Pain:  0  / 10     Post Procedural Pain: 0 / 10     Response to treatment:  Well tolerated by patient. Plan:   Discussed with patient at length that although his circulation is improved since his angiogram he still has single vessel runoff to his foot. If he limb is to be salvaged he MUST be compliant with managing his DM. He relates that he understands and will be more compliant with taking his medication and following his DM diet. Off loading RIGHT heel with mostly fibrotic appearance. Patient was encouraged to maintain non-weight bearing. He was encouraged to use his heel suspension booties to offload the area at all times while not ambulating. Continue health care for daily dressing changes with Santyl. Treatment Note please see attached Discharge Instructions    In my professional opinion this patient would benefit from HBO Therapy: Undecided       Patient is to return to wound care center in:  1 week(s)    Written patient dismissal instructions given to patient and signed by patient or POA. Discharge [facility] Physician Orders and Discharge Instructions  [facility] [id number] [place] [address]  Telephone: [phone]    NAME:  Libertad Moise  YOB: 1964  MEDICAL RECORD NUMBER:  [id number]  DATE:  12/12/2017    Wash hands with soap and water prior to and after every dressing change. Wound Cleansing:   · Do not scrub or use excessive force. · With each dressing change, rinse wounds with 0.9% Saline. (May use wound wash or soft contact solution. Both can be purchased at a local drug store). · If unable to obtain saline, may use a gentle soap and water. · Keep wounds dry in the shower unless otherwise instructed by the physician. Topical Treatments:  Do not apply lotions, creams, or ointments to wound bed unless directed.      [x] Apply moisturizing lotion to skin surrounding the wound prior to dressing change.  [] Apply antifungal ointment to skin surrounding the wound prior to dressing change.  [] Apply thin film of moisture barrier ointment to skin immediately around wound. [] Other:      Dressings:           Wound Location: Right Heel     [x] Apply Primary Dressing to wound:       [] Foam/Foam with Border  [] Mepitel/Non-adherent/Xeroform   [] Alginate with Silver    [] Alginate   [] Collagen [] Collagen with Silver     [] Hydrocolloid  [] Hydrafera Blue moistened with saline   [] MediHoney Paste/Gel [] Hydrogel      [x] Santyl with Moisten saline gauze    [] Bactroban/Mupirocin [] Polysporin  [] Other:      Pack wound loosely with  [] Iodoform   [] Plain Packing  [] Other      [x] Cover and Secure with:     [x] Gauze [] ABD [] Stretch bandage roll [x] Kerlix   [] Coban [] Ace Wrap [] Cover Roll Tape    [] Other:    Avoid contact of tape with skin. [x] Change dressing: [x] Daily    [] Every Other Day [] Three times per week   [] Once a week [] Do Not Change Dressing   [] Other:    Pressure Relief:  · When sitting, shift position or do seat lifts every 15 minutes. · Turn every 2 hours when in bed. Avoid position directing pressure on wound site. · Limit side lying to 30 degree tilt. Limit HOB elevation to 30 degrees. Edema Control:     [x] Elevate leg(s) above the level of the heart for 30 minutes 4-5 times a day and/or when sitting. [x] Avoid prolonged standing in one place. Off-Loading:   [x] Off-loading when: [x] walking  [] in bed [] sitting    [] Total non-weight bearing:  [] Right Leg  [] Left Leg     [] Partial weight bearing:   [] Right Leg  [] Left Leg     [x] Assistive Device: [] Walker [] Crutches  [x] Wheelchair [] Roll About   [] Surgical shoe/Darco    [] Podus Boot(s)   [] Prevalon Boot(s)  [] Henry Leyva    [] Cast/CAM Boot [] Other:    Dietary:  Important dietary reminders:  1. Increase Protein intake (i.e. Lean meats, fish, eggs, legumes, and yogurt)  2.  No added salt  3. If diabetic, follow a diabetic diet and check glucose prior to meals or as instructed by your physician. Return Appointment:  [] Wound and dressing supply provider:   [x] ECF or Home Healthcare: [address]:  Phone: [phone]  Fax: [phone]    [] Nurse visit:    [] Return Appointment: With Dr Kevin Stanton  in  [facility])    [] Ordered tests:       Consults: [] Weight Management [] Diabetes Education [] Vascular Surgery  [] Endocrinology [] Nutrition Counseling  [] Lymphedema Therapy     Your nurse  is:  Damon Sheets     Electronically signed by Zenaida Spicer RN on 12/12/2017 at 2:07 PM     [address] Information: Should you experience any significant changes in your wound(s) or have questions about your wound care, please contact the [address] [place] [address] at [phone] Monday and Wednesday 8:00 am - 2:00 pm and Tuesday, Thursday and Friday from 8:00 am - 4:30 pm.   If you need help with your wound outside these hours and cannot wait until we are again available, contact your PCP or go to the hospital emergency room. PLEASE NOTE: IF YOU ARE UNABLE TO OBTAIN WOUND SUPPLIES, CONTINUE TO USE THE SUPPLIES YOU HAVE AVAILABLE UNTIL YOU ARE ABLE TO REACH US. IT IS MOST IMPORTANT TO KEEP THE WOUND COVERED AT ALL TIMES. Physician orders by:     [x] Dr Lamont Ch [] Dr Addie Ruiz    [] Dr Bryant Mccoy     [] Dr Oanh Brown   [] Dr Luigi Robles  [] Dr Renard Su       Physician Signature:__________________________________        The information contained in the After Visit Summary has been reviewed with me, the patient and/or responsible adult, by my health care provider(s). I had the opportunity to ask questions regarding this information.   I have elected to receive;      [] Patient unable to sign Discharge Instructions given to ECF/Transportation/POA      []  After Visit Summary  []  Comprehensive Discharge Instruction                Electronically signed by Dafne Anaya DPM on 12/12/2017 at 2:36 PM generalized weakness secondary to surgery L LE 3/5; B UE R LE grossly >=3+/5/grossly assessed due to

## 2024-04-10 NOTE — PHYSICAL THERAPY INITIAL EVALUATION ADULT - ADDITIONAL COMMENTS
Pt lives alone in an apartment with 5 steps +2 handrails to enter, then 13 steps +1 handrail inside. Pt drives and owns crutches, RW, and commode. Pt reports being independent in all ADLs prior to surgery. Pt states son may be available if needed. Pt lives alone in an apartment, states with 5 steps +2 handrails to enter, then 13 steps +1 handrail inside. Pt states owns crutches, RW, and commode. Pt reports being independent in all ADLs prior to surgery, states currently drives. Pt states son may be available if needed.

## 2024-04-10 NOTE — CARE COORDINATION ASSESSMENT. - NSDCPLANSERVICES_GEN_ALL_CORE
CM met with patient at bedside.  Patient. A&O x 4. Pt s/p  PROCEDURES: Total left hip anterior replacement.   Pt  resting comfortably / Pt has no complaints at this time.  CM explained role of CM and availability to assist with discharge planning throughout stay.   Provided CM contact information and pt. verbalized understanding. Patient lives alone in an apartment 5+15 steps with HR. Patient was ind Prior to surgery. Patient identified his son and friend as his caregiver at home who will assist him  during his recuperation and will transport him home and to MD appointments.   Pt. is agreeable with PT/OT's recommendations for d/c plan to home with HC/PT.  CM explained home care expectations, process, insurance provisions and home safety with good understanding.   Offered list of [facility] and pt. chose [facility] at home care agency.  Provided discharge resources folder. CM made referral accordingly.  Informed them of Anticipated  DC date for 4/10/2024 and for SOC 4/11/2024.    Pt verbalizing understanding and in agreement with d/c plans.  DME: Pt has a RW, commode at home.  PCP: Dr Koko Gale [phone]  Pt stated he will be receiving meds to bed from [facility] pharmacy prior to DC./Home Care

## 2024-04-10 NOTE — DISCHARGE NOTE PROVIDER - NSDCFUADDAPPT_GEN_ALL_CORE_FT
It is advisable to follow up with your primary care provider within the next 2-3 weeks to ensure your medications are appropriate and there are no underlying problems after your procedure.       You f/u appt is scheduled with Merlyn Holloway at Dr. Webb's office.

## 2024-04-10 NOTE — DISCHARGE NOTE PROVIDER - HOSPITAL COURSE
This patient was admitted to [facility] with a history of severe degenerative joint disease of the left hip.  Patient underwent Pre-Surgical Testing and was medically cleared to undergo elective procedure. Patient underwent Left Anterior total hip arthroplasty  by Dr. Webb on 4/10/24. Procedure was well tolerated.  No operative or rohini-operative complications arose during patient's hospital course.  Patient received antibiotic according to SCIP guidelines for infection prevention.  Aspirin 81mg q 12 h was given for DVT prophylaxis, in addition to the use of SCDs.  Anesthesia, Medical Hospitalist, Physical Therapy and Occupational Therapy were consulted. Patient is stable for discharge with a good prognosis.  Appropriate discharge instructions and medications are provided in this document. Patient is going home with home care (PT/OT/Skilled Nursing)

## 2024-04-10 NOTE — CONSULT NOTE ADULT - ASSESSMENT
73 yo male, pmh of OA presenting for left hip replacement  - pain control and dvt ppx as per ortho  - pt/ot  - ivf  - bowel regimen  - monitor bp  - post op labs  - will follow

## 2024-04-10 NOTE — OCCUPATIONAL THERAPY INITIAL EVALUATION ADULT - NSOTDISCHREC_GEN_A_CORE
Supervision/assist with functional activities prn which pt states friend or son will provide./No skilled OT needs

## 2024-04-11 VITALS
OXYGEN SATURATION: 98 % | HEART RATE: 96 BPM | SYSTOLIC BLOOD PRESSURE: 121 MMHG | TEMPERATURE: 98 F | DIASTOLIC BLOOD PRESSURE: 70 MMHG | RESPIRATION RATE: 18 BRPM

## 2024-04-11 LAB
ANION GAP SERPL CALC-SCNC: 6 MMOL/L — SIGNIFICANT CHANGE UP (ref 5–17)
BUN SERPL-MCNC: 19 MG/DL — SIGNIFICANT CHANGE UP (ref 7–23)
CALCIUM SERPL-MCNC: 8.4 MG/DL — SIGNIFICANT CHANGE UP (ref 8.4–10.5)
CHLORIDE SERPL-SCNC: 105 MMOL/L — SIGNIFICANT CHANGE UP (ref 96–108)
CO2 SERPL-SCNC: 26 MMOL/L — SIGNIFICANT CHANGE UP (ref 22–31)
CREAT SERPL-MCNC: 0.94 MG/DL — SIGNIFICANT CHANGE UP (ref 0.5–1.3)
EGFR: 85 ML/MIN/1.73M2 — SIGNIFICANT CHANGE UP
GLUCOSE SERPL-MCNC: 115 MG/DL — HIGH (ref 70–99)
HCT VFR BLD CALC: 34.4 % — LOW (ref 39–50)
HGB BLD-MCNC: 11.7 G/DL — LOW (ref 13–17)
MCHC RBC-ENTMCNC: 31.6 PG — SIGNIFICANT CHANGE UP (ref 27–34)
MCHC RBC-ENTMCNC: 34 GM/DL — SIGNIFICANT CHANGE UP (ref 32–36)
MCV RBC AUTO: 93 FL — SIGNIFICANT CHANGE UP (ref 80–100)
NRBC # BLD: 0 /100 WBCS — SIGNIFICANT CHANGE UP (ref 0–0)
PLATELET # BLD AUTO: 121 K/UL — LOW (ref 150–400)
POTASSIUM SERPL-MCNC: 4.3 MMOL/L — SIGNIFICANT CHANGE UP (ref 3.5–5.3)
POTASSIUM SERPL-SCNC: 4.3 MMOL/L — SIGNIFICANT CHANGE UP (ref 3.5–5.3)
RBC # BLD: 3.7 M/UL — LOW (ref 4.2–5.8)
RBC # FLD: 12.8 % — SIGNIFICANT CHANGE UP (ref 10.3–14.5)
SODIUM SERPL-SCNC: 137 MMOL/L — SIGNIFICANT CHANGE UP (ref 135–145)
WBC # BLD: 10.86 K/UL — HIGH (ref 3.8–10.5)
WBC # FLD AUTO: 10.86 K/UL — HIGH (ref 3.8–10.5)

## 2024-04-11 PROCEDURE — C1713: CPT

## 2024-04-11 PROCEDURE — 85027 COMPLETE CBC AUTOMATED: CPT

## 2024-04-11 PROCEDURE — 99231 SBSQ HOSP IP/OBS SF/LOW 25: CPT

## 2024-04-11 PROCEDURE — 80048 BASIC METABOLIC PNL TOTAL CA: CPT

## 2024-04-11 PROCEDURE — 97161 PT EVAL LOW COMPLEX 20 MIN: CPT

## 2024-04-11 PROCEDURE — 76000 FLUOROSCOPY <1 HR PHYS/QHP: CPT

## 2024-04-11 PROCEDURE — 97530 THERAPEUTIC ACTIVITIES: CPT

## 2024-04-11 PROCEDURE — 36415 COLL VENOUS BLD VENIPUNCTURE: CPT

## 2024-04-11 PROCEDURE — 97165 OT EVAL LOW COMPLEX 30 MIN: CPT

## 2024-04-11 PROCEDURE — 97116 GAIT TRAINING THERAPY: CPT

## 2024-04-11 PROCEDURE — 97110 THERAPEUTIC EXERCISES: CPT

## 2024-04-11 PROCEDURE — C1776: CPT

## 2024-04-11 PROCEDURE — 94664 DEMO&/EVAL PT USE INHALER: CPT

## 2024-04-11 PROCEDURE — 97535 SELF CARE MNGMENT TRAINING: CPT

## 2024-04-11 PROCEDURE — C1889: CPT

## 2024-04-11 RX ORDER — OXYCODONE HYDROCHLORIDE 5 MG/1
1 TABLET ORAL
Qty: 10 | Refills: 0
Start: 2024-04-11

## 2024-04-11 RX ORDER — OXYCODONE HYDROCHLORIDE 5 MG/1
1 TABLET ORAL
Qty: 42 | Refills: 0
Start: 2024-04-11

## 2024-04-11 RX ORDER — PANTOPRAZOLE SODIUM 20 MG/1
40 TABLET, DELAYED RELEASE ORAL ONCE
Refills: 0 | Status: COMPLETED | OUTPATIENT
Start: 2024-04-11 | End: 2024-04-11

## 2024-04-11 RX ADMIN — PANTOPRAZOLE SODIUM 40 MILLIGRAM(S): 20 TABLET, DELAYED RELEASE ORAL at 06:28

## 2024-04-11 RX ADMIN — Medication 101.6 MILLIGRAM(S): at 06:28

## 2024-04-11 RX ADMIN — Medication 1000 MILLIGRAM(S): at 14:55

## 2024-04-11 RX ADMIN — PANTOPRAZOLE SODIUM 40 MILLIGRAM(S): 20 TABLET, DELAYED RELEASE ORAL at 01:16

## 2024-04-11 RX ADMIN — Medication 81 MILLIGRAM(S): at 06:28

## 2024-04-11 RX ADMIN — SODIUM CHLORIDE 125 MILLILITER(S): 9 INJECTION, SOLUTION INTRAVENOUS at 06:29

## 2024-04-11 RX ADMIN — OXYCODONE HYDROCHLORIDE 5 MILLIGRAM(S): 5 TABLET ORAL at 04:00

## 2024-04-11 RX ADMIN — ONDANSETRON 4 MILLIGRAM(S): 8 TABLET, FILM COATED ORAL at 00:58

## 2024-04-11 RX ADMIN — OXYCODONE HYDROCHLORIDE 5 MILLIGRAM(S): 5 TABLET ORAL at 03:19

## 2024-04-11 RX ADMIN — Medication 1000 MILLIGRAM(S): at 13:27

## 2024-04-11 NOTE — PROGRESS NOTE ADULT - ASSESSMENT
75 yo male, pmh of OA presenting for left hip replacement  - pain control and dvt ppx as per ortho  - pt/ot  - bowel regimen  - no medical contraindication to DC

## 2024-04-11 NOTE — SBIRT NOTE ADULT - NSSBIRTALCPOSREINDET_GEN_A_CORE
Patient does not feel alcohol consumption is problematic at this time. Educated on mindful consumption. Patient admits to drinking alcohol 2-3 x per month and has 3-4 drinks

## 2024-04-11 NOTE — PROGRESS NOTE ADULT - SUBJECTIVE AND OBJECTIVE BOX
NENO GODINEZ [id number]    Pt is a 74y year old Male s/p left THR. Pain is mild at rest. Complains of stomach pain/reflux after acetaminophen and celebrex last night.  He does not want to take celebrex but will take acetaminophen. He has no history of stomach problems.  Tolerating regular diet, (+) voids.  Denies chest pain/shortness of breath/nausea/vomitting.     Vital Signs Last 24 Hrs  T(C): 36.8 (11 Apr 2024 03:30), Max: 36.9 (10 Apr 2024 12:40)  T(F): 98.2 (11 Apr 2024 03:30), Max: 98.5 (10 Apr 2024 12:40)  HR: 55 (11 Apr 2024 03:30) (55 - 93)  BP: 117/69 (11 Apr 2024 03:30) (93/76 - 128/85)  BP(mean): --  RR: 17 (11 Apr 2024 03:30) (14 - 18)  SpO2: 93% (11 Apr 2024 03:30) (93% - 99%)    Parameters below as of 10 Apr 2024 12:40  Patient On (Oxygen Delivery Method): room air          04-10 @ 07:01  -  04-11 @ 07:00  --------------------------------------------------------  IN: 2750 mL / OUT: 1000 mL / NET: 1750 mL                    PE:   LLE: Silverlon dressing CDI, Sensation intact to light touch distally, (+2) DP/PT pulses, EHL/FHL/TA intact, Capillary refill < 2 seconds. negative calf tenderness. PAS on. HV in place     A: 74y year old Male s/p left THR POD#1    Plan:   -DVT ppx = PAS +  aspirin enteric coated 81 milliGRAM(s) Oral every 12 hours    -PT/OT = OOB  -Hip dislocation precautions  -Pain control   -Medicine to follow   -Continue to Follow Labs  - Dressing change POD#2  -Incentive spirometry NENO GODINEZ [id number]    Pt is a 74y year old Male s/p left THR. Pain is mild at rest. Complains of stomach pain/reflux after acetaminophen and celebrex last night.  He does not want to take celebrex but will take acetaminophen. He has no history of stomach problems.  Tolerating regular diet, (+) voids.  Denies chest pain/shortness of breath/nausea/vomitting.     Vital Signs Last 24 Hrs  T(C): 36.8 (11 Apr 2024 03:30), Max: 36.9 (10 Apr 2024 12:40)  T(F): 98.2 (11 Apr 2024 03:30), Max: 98.5 (10 Apr 2024 12:40)  HR: 55 (11 Apr 2024 03:30) (55 - 93)  BP: 117/69 (11 Apr 2024 03:30) (93/76 - 128/85)  BP(mean): --  RR: 17 (11 Apr 2024 03:30) (14 - 18)  SpO2: 93% (11 Apr 2024 03:30) (93% - 99%)    Parameters below as of 10 Apr 2024 12:40  Patient On (Oxygen Delivery Method): room air          04-10 @ 07:01  -  04-11 @ 07:00  --------------------------------------------------------  IN: 2750 mL / OUT: 1000 mL / NET: 1750 mL                    PE:   LLE: Silverlon dressing CDI, Sensation intact to light touch distally, (+2) DP pulses, EHL/FHL/TA intact, Capillary refill < 2 seconds. Negative calf tenderness. PAS on.     A: 74y year old Male s/p left THR POD#1    Plan:   -DVT ppx = PAS +  aspirin enteric coated 81 milliGRAM(s) Oral every 12 hours    -PT/OT = OOB  -Stomach discomfort now resolved.  Patient refusing celebrex.  -Anterior Hip dislocation precautions  -Pain control excellent at this time.  Will send prescription for oxycodone.    -Medicine to follow   -Continue to Follow Labs as needed  -Incentive spirometry  -Plan for discharge to home today.

## 2024-04-11 NOTE — PROGRESS NOTE ADULT - SUBJECTIVE AND OBJECTIVE BOX
Patient is a 74y old  Male who presents with a chief complaint of Left hip pain (11 Apr 2024 07:44)      INTERVAL HPI/OVERNIGHT EVENTS:  Patient seen and examined in am, feels well, able to ambulate with walker, pain is well controlled. Denies dizziness fever, chills, nausea, vomiting.     ROS reviewed and is otherwise negative        Vital Signs Last 24 Hrs  T(C): 36.6 (11 Apr 2024 08:10), Max: 36.8 (10 Apr 2024 19:30)  T(F): 97.9 (11 Apr 2024 08:10), Max: 98.3 (10 Apr 2024 19:30)  HR: 94 (11 Apr 2024 08:10) (55 - 94)  BP: 112/71 (11 Apr 2024 08:10) (111/69 - 128/85)  BP(mean): --  RR: 16 (11 Apr 2024 08:10) (16 - 17)  SpO2: 99% (11 Apr 2024 08:10) (93% - 99%)    Parameters below as of 11 Apr 2024 08:10  Patient On (Oxygen Delivery Method): room air        PHYSICAL EXAM:  GENERAL: NAD, well-groomed, well-developed  HEAD:  Atraumatic, Normocephalic  EYES: EOMI, PERRLA, conjunctiva and sclera clear  ENMT: No tonsillar erythema, exudates, or enlargement; Moist mucous membranes, No lesions  NECK: Supple, No JVD, Normal thyroid  NERVOUS SYSTEM:  Alert & Oriented X3, Good concentration; no focal deficits  CHEST/LUNG: Clear to auscultation bilaterally; No rales, rhonchi, wheezing, or rubs  HEART: Regular rate and rhythm; No murmurs, rubs, or gallops  ABDOMEN: Soft, Nontender, Nondistended; Bowel sounds present  EXTREMITIES:  2+ Peripheral Pulses, left hip site c/d/i  LYMPH: No lymphadenopathy   SKIN: No rashes or lesions      MEDICATIONS  (STANDING):  acetaminophen     Tablet .. 1000 milliGRAM(s) Oral every 8 hours  aprepitant 40 milliGRAM(s) Oral once  aspirin enteric coated 81 milliGRAM(s) Oral every 12 hours  celecoxib 200 milliGRAM(s) Oral every 12 hours  chlorhexidine 2% Cloths 1 Application(s) Topical once  lactated ringers. 1000 milliLiter(s) (125 mL/Hr) IV Continuous <Continuous>  pantoprazole    Tablet 40 milliGRAM(s) Oral before breakfast  polyethylene glycol 3350 17 Gram(s) Oral at bedtime  senna 2 Tablet(s) Oral at bedtime    MEDICATIONS  (PRN):  HYDROmorphone  Injectable 0.5 milliGRAM(s) IV Push every 3 hours PRN Breakthrough pain  magnesium hydroxide Suspension 30 milliLiter(s) Oral daily PRN Constipation  ondansetron Injectable 4 milliGRAM(s) IV Push every 6 hours PRN Nausea and/or Vomiting  oxyCODONE    IR 5 milliGRAM(s) Oral every 3 hours PRN Moderate Pain (4 - 6)  oxyCODONE    IR 10 milliGRAM(s) Oral every 3 hours PRN Severe Pain (7 - 10)      Allergies    No Known Allergies    Intolerances          LABS:                        11.7   10.86 )-----------( 121      ( 11 Apr 2024 06:00 )             34.4     11 Apr 2024 06:00    137    |  105    |  19     ----------------------------<  115    4.3     |  26     |  0.94     Ca    8.4        11 Apr 2024 06:00        Urinalysis Basic - ( 11 Apr 2024 06:00 )    Color: x / Appearance: x / SG: x / pH: x  Gluc: 115 mg/dL / Ketone: x  / Bili: x / Urobili: x   Blood: x / Protein: x / Nitrite: x   Leuk Esterase: x / RBC: x / WBC x   Sq Epi: x / Non Sq Epi: x / Bacteria: x      CAPILLARY BLOOD GLUCOSE          RADIOLOGY & ADDITIONAL TESTS:        Care Discussed with Consultants/Other Providers:    Advanced Directives: [ ] DNR  [ ] No feeding tube  [ ] MOLST in chart  [ ] MOLST completed today  [ ] Unknown

## 2024-04-19 ENCOUNTER — NON-APPOINTMENT (OUTPATIENT)
Age: 74
End: 2024-04-19

## 2024-04-19 RX ORDER — MELOXICAM 15 MG/1
15 TABLET ORAL
Qty: 20 | Refills: 0 | Status: ACTIVE | COMMUNITY
Start: 2024-04-19 | End: 1900-01-01

## 2024-04-25 ENCOUNTER — APPOINTMENT (OUTPATIENT)
Dept: ORTHOPEDIC SURGERY | Facility: CLINIC | Age: 74
End: 2024-04-25
Payer: MEDICARE

## 2024-04-25 VITALS — HEART RATE: 89 BPM | DIASTOLIC BLOOD PRESSURE: 75 MMHG | TEMPERATURE: 97.8 F | SYSTOLIC BLOOD PRESSURE: 128 MMHG

## 2024-04-25 DIAGNOSIS — Z96.642 PRESENCE OF LEFT ARTIFICIAL HIP JOINT: ICD-10-CM

## 2024-04-25 PROCEDURE — 73502 X-RAY EXAM HIP UNI 2-3 VIEWS: CPT | Mod: LT

## 2024-04-25 PROCEDURE — 99024 POSTOP FOLLOW-UP VISIT: CPT

## 2024-04-25 RX ORDER — AMOXICILLIN 500 MG/1
500 CAPSULE ORAL
Qty: 10 | Refills: 3 | Status: ACTIVE | COMMUNITY
Start: 2024-04-25 | End: 1900-01-01

## 2024-04-25 NOTE — HISTORY OF PRESENT ILLNESS
[___ Weeks Post Op] : [unfilled] weeks post op [0] : no pain reported [Chills] : no chills [Constipation] : no constipation [Diarrhea] : no diarrhea [Dysuria] : no dysuria [Fever] : no fever [Nausea] : no nausea [Vomiting] : no vomiting [Clean/Dry/Intact] : clean, dry and intact [Erythema] : not erythematous [Discharge] : absent of discharge [Swelling] : not swollen [Dehiscence] : not dehisced [Neuro Intact] : an unremarkable neurological exam [Vascular Intact] : ~T peripheral vascular exam normal [Negative Rose's] : maneuvers demonstrated a negative Rose's sign [Xray (Date:___)] : [unfilled] Xray -  [Doing Well] : is doing well [Excellent Pain Control] : has excellent pain control [No Sign of Infection] : is showing no signs of infection [de-identified] : The patient is a 74-year-old male S/P left anterior total hip replacement performed at [facility] on 4/10/24. Patient presents today for his first post operative visit and Dermabond tape removal. [de-identified] : The patient was discharged from the hospital with physical therapy and home exercises. He verbalized "the hip is feeling great". The patient reports surgical pain of 0/10. He denies utilizing any pain medication, "I even stopped taking Tylenol". Patient is using EC. 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 He is ambulating safely without assistive device.  [de-identified] : The patient has stable minimal antalgic gait ambulating independently. The left hip is with Dermabond tape intact. The incision site is without redness, drainage or heat. There is no palpable tenderness, hematoma or effusion. The left hip actively flexes to 90 degrees with no discomfort. The external and internal rotation of the left hip is with no discomfort or stiffness. Leg lengths appear equal. There is no evidence of infection or cellulitis. [de-identified] : 1 view of the left hip and pelvis were obtained. X-rays reveal a well-positioned, well fit, total hip replacement in excellent alignment. No obvious evidence of fractures, dislocation or osteolysis noted. [de-identified] : Left anterior total hip replacement. [de-identified] : Dermabond tape was removed from the left hip incision and replaced with Steri-Strips. The patient tolerated it well. Incisional care was reviewed with the patient.  The patient was advised of the nature of the healing process. Patient was advised of the importance of adhering to the DVT prophylaxis protocol utilizing EC. Aspirin 81 MG twice a day until 4 weeks post operative. The patient was advised to continue physical therapy and home exercises as recommended. Prescription was given to patient for antibiotic Amoxicillin for dental prophylaxis as per protocol. Continue with Meloxicam as prescribed for 21 days post operative for HO prophylaxis. Signs and symptoms of infection were reviewed with the patient. Anterior total hip precautions were reviewed with the patient and advised to continue to maintain the precautions until 6 weeks post operative, then start to slowly release. Patient will make an appointment to follow up with Dr. Webb in six weeks. Patient verbalized understanding of all instructions. All his questions were addressed to his satisfaction. The patient was advised to call the office at any time with new questions or concerns. My cumulative time spent on this patient's visit was spent on preparation for the visit, review of the medical records, review of pertinent diagnostic studies, examination and counseling of the patient on the above diagnosis, treatment plan and prognosis, orders of diagnostic tests, medications and/or appropriate procedures and documentation in the medical records of today's visit.  Not including time spent for procedures.

## 2024-05-07 NOTE — H&P PST ADULT - CIGARETTES, PACKS/DAY
patient may be discharged when he is able to care for himself and no longer assaultive towards others 1  patient may be discharged when he is able to care for himself and no longer assaultive towards others

## 2024-05-15 ENCOUNTER — NON-APPOINTMENT (OUTPATIENT)
Age: 74
End: 2024-05-15

## 2024-06-11 ENCOUNTER — APPOINTMENT (OUTPATIENT)
Dept: ORTHOPEDIC SURGERY | Facility: CLINIC | Age: 74
End: 2024-06-11
Payer: MEDICARE

## 2024-06-11 VITALS — DIASTOLIC BLOOD PRESSURE: 83 MMHG | SYSTOLIC BLOOD PRESSURE: 126 MMHG | HEART RATE: 67 BPM

## 2024-06-11 DIAGNOSIS — S83.241D OTHER TEAR OF MEDIAL MENISCUS, CURRENT INJURY, RIGHT KNEE, SUBSEQUENT ENCOUNTER: ICD-10-CM

## 2024-06-11 PROCEDURE — 99214 OFFICE O/P EST MOD 30 MIN: CPT | Mod: 24

## 2024-06-11 PROCEDURE — 99024 POSTOP FOLLOW-UP VISIT: CPT

## 2024-06-11 PROCEDURE — 73502 X-RAY EXAM HIP UNI 2-3 VIEWS: CPT | Mod: LT

## 2024-06-11 NOTE — HISTORY OF PRESENT ILLNESS
[___ Months Post Op] : [unfilled] months post op [Clean/Dry/Intact] : clean, dry and intact [Healed] : healed [Neuro Intact] : an unremarkable neurological exam [Vascular Intact] : ~T peripheral vascular exam normal [Negative Rose's] : maneuvers demonstrated a negative Rose's sign [Doing Well] : is doing well [Excellent Pain Control] : has excellent pain control [No Sign of Infection] : is showing no signs of infection [Chills] : no chills [Constipation] : no constipation [Diarrhea] : no diarrhea [Dysuria] : no dysuria [Fever] : no fever [Nausea] : no nausea [Vomiting] : no vomiting [Erythema] : not erythematous [Discharge] : absent of discharge [Swelling] : not swollen [Dehiscence] : not dehisced [de-identified] : S/P left anterior total hip replacement. 04/10/2024 Right knee pain, medial meniscal tear [de-identified] : Patient is a 74-year-old male who presents today for an evaluation of her left hip.  He is status post left total hip replacement from the anterior approach on April 10, 2024.  Overall the patient states he is doing very well with no complaints of any pain or discomfort.  He states that he is slowly returning back to his previous activities and does lead a very active life.  He does not take any pain medication at the present time.  He denies any history of injury or accident. Patient is also to discuss possible treatment options to his right knee pain.  Previous treatment did not help him.  MRI of his right knee shows complex tear of the medial meniscus. [de-identified] : On physical examination of the left hip.  Patient is status post left total hip replacement.  There is a well-healed surgical scar noted anteriorly.  There is no redness, swelling, heat, ecchymosis, discharge, pain or tenderness on examination.  The patient has excellent range of motion, passively, actively and against resistance, in all planes.  There is no evidence of limb length discrepancy.  No evidence of muscle atrophy. No palpable defect. No evidence of any motor or sensory deficit.  Patient has good distal pulses with no calf tenderness. [de-identified] : X-rays of the left hip were taken today.  They are the pelvis AP and lateral hips.  It reveals a status post left total hip replacement. In both views, the pelvis and left hip. The hardware is in place and shows excellent alignment as well as fixation. There is no evidence of limb length discrepancies. There is no evidence of any fracture, dislocation, loosening of the prosthesis.  [de-identified] : Status post left total hip replacement from the anterior approach on April 10, 2024 [de-identified] : The patient was assured that they are progressing well post operatively. They were explained that the prosthesis is in perfect alignment, perfect placement and fixated well.  It was explained that they are progressing well and as expected. It is recommended that the pt continue with the current treatment plan. This includes an ongoing exercise program, ice/moist heat when needed and NSAID's when needed. It was explained that a good exercise routine will help with pain relief and improve the overall longevity of the prosthesis. The patient is advised to return to the office in another year or so for further evaluation and x-rays. However, if there is any increase in pain, redness, swelling, heat, discharge, fever, change in ambulation or pain. The patient is strongly advised to call the office sooner. With regard to the patient right knee pain.  I discussed with the patient possible treatment options including nonsurgical and surgical.  With regard to surgery the patient will be a candidate for arthroscopic medial meniscectomy.  I discussed the surgical procedure in detail with the patient.  I also explained on the expected recovery and time to return to full activity.  I explained on the risk benefits and possible complication of arthroscopic surgery.  Patient also understand that arthroscopic meniscectomy will address the meniscus but would not address any arthritic changes that he may have in his right knee.  I answered all his questions to his satisfaction.  I, Dr. Webb, personally performed the evaluation and management services for this established patient who presents today with an orthopedic condition. The evaluation and management includes conducting the conditions and establishing a plan of care.   Today, my ACP Baljinder Moran [facility], was here to observe the patient in my evaluation and management services for this condition which will be followed going forward.  35 minutes were spent, face to face, in direct consultation with the patient. This includes reviewing the natural history of their Dx., eliciting the history, performing an orthopedic exam, review of the x-ray findings, forming a differential Dx and discussing all treatment options. This Includes both surgical and non-surgical treatments. I also reviewed all the risks and benefits of non-operative & operative Tx options, future impact into orthopedic functions/problems, activity restrictions both at home and at work, and all follow up requirements.

## 2024-06-20 ENCOUNTER — OUTPATIENT (OUTPATIENT)
Dept: OUTPATIENT SERVICES | Facility: HOSPITAL | Age: 74
LOS: 1 days | End: 2024-06-20
Payer: MEDICARE

## 2024-06-20 VITALS
HEART RATE: 80 BPM | TEMPERATURE: 98 F | DIASTOLIC BLOOD PRESSURE: 77 MMHG | SYSTOLIC BLOOD PRESSURE: 125 MMHG | WEIGHT: 147.71 LBS | OXYGEN SATURATION: 99 % | HEIGHT: 66 IN | RESPIRATION RATE: 16 BRPM

## 2024-06-20 DIAGNOSIS — S83.241D OTHER TEAR OF MEDIAL MENISCUS, CURRENT INJURY, RIGHT KNEE, SUBSEQUENT ENCOUNTER: ICD-10-CM

## 2024-06-20 DIAGNOSIS — Z98.890 OTHER SPECIFIED POSTPROCEDURAL STATES: Chronic | ICD-10-CM

## 2024-06-20 DIAGNOSIS — Z96.642 PRESENCE OF LEFT ARTIFICIAL HIP JOINT: Chronic | ICD-10-CM

## 2024-06-20 DIAGNOSIS — Z01.818 ENCOUNTER FOR OTHER PREPROCEDURAL EXAMINATION: ICD-10-CM

## 2024-06-20 LAB
ALBUMIN SERPL ELPH-MCNC: 3.6 G/DL — SIGNIFICANT CHANGE UP (ref 3.3–5)
ALP SERPL-CCNC: 127 U/L — HIGH (ref 30–120)
ALT FLD-CCNC: 27 U/L — SIGNIFICANT CHANGE UP (ref 10–60)
ANION GAP SERPL CALC-SCNC: 7 MMOL/L — SIGNIFICANT CHANGE UP (ref 5–17)
AST SERPL-CCNC: 23 U/L — SIGNIFICANT CHANGE UP (ref 10–40)
BILIRUB SERPL-MCNC: 0.4 MG/DL — SIGNIFICANT CHANGE UP (ref 0.2–1.2)
BUN SERPL-MCNC: 26 MG/DL — HIGH (ref 7–23)
CALCIUM SERPL-MCNC: 9.3 MG/DL — SIGNIFICANT CHANGE UP (ref 8.4–10.5)
CHLORIDE SERPL-SCNC: 104 MMOL/L — SIGNIFICANT CHANGE UP (ref 96–108)
CO2 SERPL-SCNC: 28 MMOL/L — SIGNIFICANT CHANGE UP (ref 22–31)
CREAT SERPL-MCNC: 1 MG/DL — SIGNIFICANT CHANGE UP (ref 0.5–1.3)
EGFR: 79 ML/MIN/1.73M2 — SIGNIFICANT CHANGE UP
GLUCOSE SERPL-MCNC: 94 MG/DL — SIGNIFICANT CHANGE UP (ref 70–99)
HCT VFR BLD CALC: 46.4 % — SIGNIFICANT CHANGE UP (ref 39–50)
HGB BLD-MCNC: 15.7 G/DL — SIGNIFICANT CHANGE UP (ref 13–17)
MCHC RBC-ENTMCNC: 32 PG — SIGNIFICANT CHANGE UP (ref 27–34)
MCHC RBC-ENTMCNC: 33.8 GM/DL — SIGNIFICANT CHANGE UP (ref 32–36)
MCV RBC AUTO: 94.5 FL — SIGNIFICANT CHANGE UP (ref 80–100)
NRBC # BLD: 0 /100 WBCS — SIGNIFICANT CHANGE UP (ref 0–0)
PLATELET # BLD AUTO: 180 K/UL — SIGNIFICANT CHANGE UP (ref 150–400)
POTASSIUM SERPL-MCNC: 4.7 MMOL/L — SIGNIFICANT CHANGE UP (ref 3.5–5.3)
POTASSIUM SERPL-SCNC: 4.7 MMOL/L — SIGNIFICANT CHANGE UP (ref 3.5–5.3)
PROT SERPL-MCNC: 7.4 G/DL — SIGNIFICANT CHANGE UP (ref 6–8.3)
RBC # BLD: 4.91 M/UL — SIGNIFICANT CHANGE UP (ref 4.2–5.8)
RBC # FLD: 13.1 % — SIGNIFICANT CHANGE UP (ref 10.3–14.5)
SODIUM SERPL-SCNC: 139 MMOL/L — SIGNIFICANT CHANGE UP (ref 135–145)
WBC # BLD: 6.85 K/UL — SIGNIFICANT CHANGE UP (ref 3.8–10.5)
WBC # FLD AUTO: 6.85 K/UL — SIGNIFICANT CHANGE UP (ref 3.8–10.5)

## 2024-06-20 PROCEDURE — 80053 COMPREHEN METABOLIC PANEL: CPT

## 2024-06-20 PROCEDURE — 85027 COMPLETE CBC AUTOMATED: CPT

## 2024-06-20 PROCEDURE — G0463: CPT

## 2024-06-20 PROCEDURE — 36415 COLL VENOUS BLD VENIPUNCTURE: CPT

## 2024-06-27 PROBLEM — M25.561 PAIN IN RIGHT KNEE: Chronic | Status: ACTIVE | Noted: 2024-06-20

## 2024-07-02 ENCOUNTER — TRANSCRIPTION ENCOUNTER (OUTPATIENT)
Age: 74
End: 2024-07-02

## 2024-07-03 ENCOUNTER — TRANSCRIPTION ENCOUNTER (OUTPATIENT)
Age: 74
End: 2024-07-03

## 2024-07-03 ENCOUNTER — OUTPATIENT (OUTPATIENT)
Dept: OUTPATIENT SERVICES | Facility: HOSPITAL | Age: 74
LOS: 1 days | End: 2024-07-03
Payer: MEDICARE

## 2024-07-03 ENCOUNTER — APPOINTMENT (OUTPATIENT)
Dept: ORTHOPEDIC SURGERY | Facility: HOSPITAL | Age: 74
End: 2024-07-03

## 2024-07-03 VITALS
WEIGHT: 143.74 LBS | HEART RATE: 65 BPM | HEIGHT: 66 IN | RESPIRATION RATE: 17 BRPM | DIASTOLIC BLOOD PRESSURE: 84 MMHG | OXYGEN SATURATION: 99 % | SYSTOLIC BLOOD PRESSURE: 129 MMHG | TEMPERATURE: 98 F

## 2024-07-03 VITALS
SYSTOLIC BLOOD PRESSURE: 128 MMHG | DIASTOLIC BLOOD PRESSURE: 74 MMHG | HEART RATE: 84 BPM | OXYGEN SATURATION: 98 % | RESPIRATION RATE: 16 BRPM

## 2024-07-03 DIAGNOSIS — S83.241D OTHER TEAR OF MEDIAL MENISCUS, CURRENT INJURY, RIGHT KNEE, SUBSEQUENT ENCOUNTER: ICD-10-CM

## 2024-07-03 DIAGNOSIS — Z01.818 ENCOUNTER FOR OTHER PREPROCEDURAL EXAMINATION: ICD-10-CM

## 2024-07-03 DIAGNOSIS — Z98.890 OTHER SPECIFIED POSTPROCEDURAL STATES: Chronic | ICD-10-CM

## 2024-07-03 DIAGNOSIS — Z96.642 PRESENCE OF LEFT ARTIFICIAL HIP JOINT: Chronic | ICD-10-CM

## 2024-07-03 PROCEDURE — 29870 ARTHRS KNEE DX W/WO SYN BX: CPT | Mod: 79,RT

## 2024-07-03 PROCEDURE — 29880 ARTHRS KNE SRG MNISECTMY M&L: CPT | Mod: RT

## 2024-07-03 PROCEDURE — 97161 PT EVAL LOW COMPLEX 20 MIN: CPT

## 2024-07-03 RX ORDER — HYDROMORPHONE HCL 0.2 MG/ML
0.5 INJECTION, SOLUTION INTRAVENOUS
Refills: 0 | Status: DISCONTINUED | OUTPATIENT
Start: 2024-07-03 | End: 2024-07-05

## 2024-07-03 RX ORDER — DEXTROSE MONOHYDRATE AND SODIUM CHLORIDE 5; .3 G/100ML; G/100ML
1000 INJECTION, SOLUTION INTRAVENOUS
Refills: 0 | Status: DISCONTINUED | OUTPATIENT
Start: 2024-07-03 | End: 2024-07-05

## 2024-07-03 RX ORDER — OXYCODONE HYDROCHLORIDE 100 MG/5ML
1 SOLUTION ORAL
Qty: 20 | Refills: 0
Start: 2024-07-03

## 2024-07-03 RX ORDER — HYDROMORPHONE HCL 0.2 MG/ML
0.2 INJECTION, SOLUTION INTRAVENOUS
Refills: 0 | Status: DISCONTINUED | OUTPATIENT
Start: 2024-07-03 | End: 2024-07-05

## 2024-07-03 RX ORDER — ONDANSETRON HYDROCHLORIDE 2 MG/ML
4 INJECTION INTRAMUSCULAR; INTRAVENOUS ONCE
Refills: 0 | Status: DISCONTINUED | OUTPATIENT
Start: 2024-07-03 | End: 2024-07-05

## 2024-07-03 RX ORDER — CEFAZOLIN 10 G/1
2000 INJECTION, POWDER, FOR SOLUTION INTRAVENOUS ONCE
Refills: 0 | Status: COMPLETED | OUTPATIENT
Start: 2024-07-03 | End: 2024-07-03

## 2024-07-03 RX ORDER — APREPITANT 125MG-80MG
40 KIT ORAL ONCE
Refills: 0 | Status: COMPLETED | OUTPATIENT
Start: 2024-07-03 | End: 2024-07-03

## 2024-07-03 RX ADMIN — APREPITANT 40 MILLIGRAM(S): KIT at 08:16

## 2024-07-03 RX ADMIN — Medication 1 APPLICATION(S): at 08:17

## 2024-07-18 ENCOUNTER — APPOINTMENT (OUTPATIENT)
Dept: ORTHOPEDIC SURGERY | Facility: CLINIC | Age: 74
End: 2024-07-18

## 2024-08-13 ENCOUNTER — APPOINTMENT (OUTPATIENT)
Dept: ORTHOPEDIC SURGERY | Facility: CLINIC | Age: 74
End: 2024-08-13
Payer: MEDICARE

## 2024-08-13 DIAGNOSIS — Z98.890 OTHER SPECIFIED POSTPROCEDURAL STATES: ICD-10-CM

## 2024-08-13 PROCEDURE — 99024 POSTOP FOLLOW-UP VISIT: CPT

## 2024-08-13 NOTE — HISTORY OF PRESENT ILLNESS
[___ Months Post Op] : [unfilled] months post op [0] : no pain reported [Clean/Dry/Intact] : clean, dry and intact [Healed] : healed [Neuro Intact] : an unremarkable neurological exam [Vascular Intact] : ~T peripheral vascular exam normal [Negative Rose's] : maneuvers demonstrated a negative Rose's sign [Doing Well] : is doing well [Excellent Pain Control] : has excellent pain control [No Sign of Infection] : is showing no signs of infection [Sutures Removed] : sutures were removed [Chills] : no chills [Fever] : no fever [Erythema] : not erythematous [Discharge] : absent of discharge [Swelling] : not swollen [Dehiscence] : not dehisced [de-identified] : s/p Right knee arthroscopic medial and lateral meniscectomy 7/2/24 [de-identified] : 74-year-old male presents for follow-up of the right knee status post right knee arthroscopy on 7/3/2024.  Overall the postoperative period patient is doing very well.  He has no pain.  He does states that the site where the incision is with the stitches is very sensitive.  He has no fevers chills chest pain calf pain shortness of breath.  He is not taking anti-inflammatories or pain medications.  He states he feels wonderful since his surgery. Denies fevers, chills, chest pain, calf pain, shortness of breath. [de-identified] : Right Knee: Skin is clean, dry, intact. Swelling: No significant swelling Effusion: No significant effusion Alignment: Neutral alignment Tenderness: None Incision: Well healed arthroscopic portal incisions with nylon sutures intact Skin Temp: Warm to touch ROM: Flexion: 130 deg.; Extension: 0 deg, Laxity A-P: Negative anteroposterior drawer Laxity M-L: Less than 5 degree laxity varus valgus stresses PF crepitus: None Sensation intact throughout the distal lower extremity Pulses: 2+ dorsalis pedis pulses Quad/Ham St: 5/5 [de-identified] : No images performed today [de-identified] : Advised patient he is doing well status post right knee arthroscopy.  He is able to do all his activities of daily living without any issues.  Encouraged him to exercise within tolerances.  He was anti-inflammatory/rest when seated needed for pain.  His sutures were removed and Steri-Strips were applied.  Prior to Steri-Strip placement the wounds were cleansed with chlorhexidine.  All patient's questions concerns were addressed satisfaction.  If any new or worsening symptoms arise advised patient to call the office.

## 2024-08-13 NOTE — HISTORY OF PRESENT ILLNESS
[___ Months Post Op] : [unfilled] months post op [0] : no pain reported [Clean/Dry/Intact] : clean, dry and intact [Healed] : healed [Neuro Intact] : an unremarkable neurological exam [Vascular Intact] : ~T peripheral vascular exam normal [Negative Rose's] : maneuvers demonstrated a negative Rose's sign [Doing Well] : is doing well [Excellent Pain Control] : has excellent pain control [No Sign of Infection] : is showing no signs of infection [Sutures Removed] : sutures were removed [Chills] : no chills [Fever] : no fever [Erythema] : not erythematous [Discharge] : absent of discharge [Swelling] : not swollen [Dehiscence] : not dehisced [de-identified] : s/p Right knee arthroscopic medial and lateral meniscectomy 7/2/24 [de-identified] : 74-year-old male presents for follow-up of the right knee status post right knee arthroscopy on 7/3/2024.  Overall the postoperative period patient is doing very well.  He has no pain.  He does states that the site where the incision is with the stitches is very sensitive.  He has no fevers chills chest pain calf pain shortness of breath.  He is not taking anti-inflammatories or pain medications.  He states he feels wonderful since his surgery. Denies fevers, chills, chest pain, calf pain, shortness of breath. [de-identified] : Right Knee: Skin is clean, dry, intact. Swelling: No significant swelling Effusion: No significant effusion Alignment: Neutral alignment Tenderness: None Incision: Well healed arthroscopic portal incisions with nylon sutures intact Skin Temp: Warm to touch ROM: Flexion: 130 deg.; Extension: 0 deg, Laxity A-P: Negative anteroposterior drawer Laxity M-L: Less than 5 degree laxity varus valgus stresses PF crepitus: None Sensation intact throughout the distal lower extremity Pulses: 2+ dorsalis pedis pulses Quad/Ham St: 5/5 [de-identified] : No images performed today [de-identified] : Advised patient he is doing well status post right knee arthroscopy.  He is able to do all his activities of daily living without any issues.  Encouraged him to exercise within tolerances.  He was anti-inflammatory/rest when seated needed for pain.  His sutures were removed and Steri-Strips were applied.  Prior to Steri-Strip placement the wounds were cleansed with chlorhexidine.  All patient's questions concerns were addressed satisfaction.  If any new or worsening symptoms arise advised patient to call the office.

## 2024-08-13 NOTE — END OF VISIT
[FreeTextEntry3] : I, Dr. Webb, personally performed the evaluation and management (E/M) services for this established patient who presents today with an exacerbation of an existing condition. That E/M includes conducting the clinically appropriate interval history &/or exam, assessing all new/exacerbated conditions, and establishing a new plan of care. Today, my JOSSUE, Josue Bella PA-C, was here to observe my evaluation and management service for this new problem/exacerbated condition and follow the plan of care established by me going forward.

## 2024-09-25 NOTE — ASU PATIENT PROFILE, ADULT - AS SC BRADEN ACTIVITY
Detail Level: Simple
Price (Do Not Change): 0.00
Instructions: This plan will send the code FBSE to the PM system.  DO NOT or CHANGE the price.
(4) walks frequently

## 2024-10-16 NOTE — PHYSICAL THERAPY INITIAL EVALUATION ADULT - MD ORDER
Symptoms are stable  Symptoms are stable.  Continue current inhalers, Singulair 10 mg at bedtime.        PT consult, evaluate, and treat

## 2025-03-14 ENCOUNTER — NON-APPOINTMENT (OUTPATIENT)
Age: 75
End: 2025-03-14

## 2025-05-29 NOTE — DISCHARGE NOTE PROVIDER - CARE PROVIDER_API CALL
29-May-2025 16:30 Jorge A Webb  Orthopaedic Surgery  [address], [address]  [place] [address]  Phone: [phone]  Fax: [phone]  Established Patient  Scheduled Appointment: 04/25/2024

## 2025-07-22 ENCOUNTER — APPOINTMENT (OUTPATIENT)
Dept: ORTHOPEDIC SURGERY | Facility: CLINIC | Age: 75
End: 2025-07-22
Payer: MEDICARE

## 2025-07-22 VITALS — HEIGHT: 66 IN | BODY MASS INDEX: 24.11 KG/M2 | WEIGHT: 150 LBS

## 2025-07-22 DIAGNOSIS — Z96.642 PRESENCE OF LEFT ARTIFICIAL HIP JOINT: ICD-10-CM

## 2025-07-22 PROCEDURE — 73501 X-RAY EXAM HIP UNI 1 VIEW: CPT

## 2025-07-22 PROCEDURE — 72170 X-RAY EXAM OF PELVIS: CPT | Mod: LT

## 2025-07-22 PROCEDURE — 99214 OFFICE O/P EST MOD 30 MIN: CPT

## 2025-07-22 RX ORDER — MELOXICAM 15 MG/1
15 TABLET ORAL
Qty: 30 | Refills: 1 | Status: ACTIVE | COMMUNITY
Start: 2025-07-22 | End: 1900-01-01

## (undated) DEVICE — DRSG COBAN 1"

## (undated) DEVICE — POSITIONER STRAP ARMBOARD VELCRO TS-30

## (undated) DEVICE — SYR LUER LOK 50CC

## (undated) DEVICE — POSITIONER FOAM HEAD CRADLE (PINK)

## (undated) DEVICE — VENODYNE/SCD SLEEVE CALF MEDIUM

## (undated) DEVICE — GOWN XL

## (undated) DEVICE — DRSG COMBINE 5X9"

## (undated) DEVICE — POSITIONER POSITIONING ROLL  5X17"

## (undated) DEVICE — CANISTER SUCTION LID GUARD 3000CC

## (undated) DEVICE — GLV 8 PROTEXIS (WHITE)

## (undated) DEVICE — TUBING SET GRAVITY 4 SPIKE

## (undated) DEVICE — DVC SUCTION FLR PUDDLE GUPPY

## (undated) DEVICE — SOL IRR POUR NS 0.9% 500ML

## (undated) DEVICE — SOL IRR BAG NS 0.9% 3000ML

## (undated) DEVICE — PACK TOTAL HIP

## (undated) DEVICE — SYM-ARTHROSCOPY SHAVER: Type: DURABLE MEDICAL EQUIPMENT

## (undated) DEVICE — SHAVER BLADE GREAT WHITE 4.2MM 15DEG BENDABLE

## (undated) DEVICE — DRSG DERMABOND PRINEO 60CM

## (undated) DEVICE — NDL SPINAL 18G X 3.5" (PINK)

## (undated) DEVICE — Device

## (undated) DEVICE — WARMING BLANKET UPPER ADULT

## (undated) DEVICE — GLV 7.5 PROTEXIS (CREAM) MICRO

## (undated) DEVICE — GOWN XL W TOWEL

## (undated) DEVICE — TUBING SUCTION 20FT

## (undated) DEVICE — NDL HYPO SAFE 20G X 1" (YELLOW)

## (undated) DEVICE — TUBING CONNECTING 6MM 20FT

## (undated) DEVICE — GLV 8 PROTEXIS (BLUE)

## (undated) DEVICE — DRSG SILVERLON ISLAND 4X6" 2X4" PAD

## (undated) DEVICE — SOL IRR POUR H2O 1500ML

## (undated) DEVICE — PREP BETADINE KIT

## (undated) DEVICE — WOUND IRR IRRISEPT W 0.5 CHG

## (undated) DEVICE — DRILL BIT MICROAIRE TWIST 2.4MM X 127MM

## (undated) DEVICE — ELCTR ROCKER SWITCH PENCIL BLUE 10FT

## (undated) DEVICE — HANDPIECE INTERPULSE W MULTI TIP

## (undated) DEVICE — SOLIDIFIER CANN EXPRESS 3K

## (undated) DEVICE — POSITIONER PATIENT SAFETY STRAP 3X60"

## (undated) DEVICE — BAG SPONGE COUNTER EZ

## (undated) DEVICE — GLV 7.5 PROTEXIS (WHITE)

## (undated) DEVICE — POSITIONER FOAM ABDUCTION PILLOW MED (PINK)

## (undated) DEVICE — SOL IRR POUR H2O 500ML

## (undated) DEVICE — DRSG SILVERLON ISLAND 4X10" 2X8" PAD

## (undated) DEVICE — SPECIMEN CONTAINER PET

## (undated) DEVICE — PACK MINOR WITH LAP

## (undated) DEVICE — DRSG XEROFORM 5 X 9"

## (undated) DEVICE — CANNULA LINVATEC SHOULDER 7CM (GREY & ORANGE)

## (undated) DEVICE — PACK KNEE ARTHROSCOPY

## (undated) DEVICE — ELCTR GROUNDING PAD ADULT COVIDIEN

## (undated) DEVICE — SUT ETHIBOND 1 30" OS6

## (undated) DEVICE — POSITIONER STIRRUP STRAP W SLIP RING 19X3.5"

## (undated) DEVICE — GLV 8 PROTEXIS (CREAM) MICRO

## (undated) DEVICE — SUT MONOSOF 3-0 18" C-14

## (undated) DEVICE — HOOD FLYTE STRYKER HELMET SHIELD

## (undated) DEVICE — SUT CHROMIC 3-0 27" SH

## (undated) DEVICE — S&N ARTHROCARE WAND COBLATION WEREWOLF FLOW 50

## (undated) DEVICE — WARMING BLANKET UNDERBODY PEDS 36 X 33"

## (undated) DEVICE — SUT VICRYL PLUS 2-0 27" CP-1 UNDYED

## (undated) DEVICE — SUT CHROMIC 4-0 27" SH

## (undated) DEVICE — SUT VICRYL PLUS 1 27" CP UNDYED

## (undated) DEVICE — ELCTR AQUAMANTYS BIPOLAR SEALER 6.0

## (undated) DEVICE — CANISTER SUCTION LINER 1500 CC

## (undated) DEVICE — SUT MONOCRYL 3-0 18" PS-1